# Patient Record
Sex: FEMALE | Race: WHITE | NOT HISPANIC OR LATINO
[De-identification: names, ages, dates, MRNs, and addresses within clinical notes are randomized per-mention and may not be internally consistent; named-entity substitution may affect disease eponyms.]

---

## 2017-05-15 ENCOUNTER — APPOINTMENT (OUTPATIENT)
Dept: MAMMOGRAPHY | Facility: IMAGING CENTER | Age: 57
End: 2017-05-15

## 2017-05-15 ENCOUNTER — OUTPATIENT (OUTPATIENT)
Dept: OUTPATIENT SERVICES | Facility: HOSPITAL | Age: 57
LOS: 1 days | End: 2017-05-15
Payer: COMMERCIAL

## 2017-05-15 DIAGNOSIS — Z00.8 ENCOUNTER FOR OTHER GENERAL EXAMINATION: ICD-10-CM

## 2017-05-15 PROCEDURE — 77067 SCR MAMMO BI INCL CAD: CPT

## 2017-05-15 PROCEDURE — 77063 BREAST TOMOSYNTHESIS BI: CPT

## 2017-05-24 ENCOUNTER — APPOINTMENT (OUTPATIENT)
Dept: SURGICAL ONCOLOGY | Facility: CLINIC | Age: 57
End: 2017-05-24

## 2017-05-24 VITALS
HEART RATE: 82 BPM | TEMPERATURE: 98.4 F | WEIGHT: 134 LBS | HEIGHT: 61 IN | BODY MASS INDEX: 25.3 KG/M2 | RESPIRATION RATE: 15 BRPM | DIASTOLIC BLOOD PRESSURE: 81 MMHG | SYSTOLIC BLOOD PRESSURE: 141 MMHG

## 2017-05-24 DIAGNOSIS — Z82.49 FAMILY HISTORY OF ISCHEMIC HEART DISEASE AND OTHER DISEASES OF THE CIRCULATORY SYSTEM: ICD-10-CM

## 2017-10-04 ENCOUNTER — TRANSCRIPTION ENCOUNTER (OUTPATIENT)
Age: 57
End: 2017-10-04

## 2018-05-15 ENCOUNTER — APPOINTMENT (OUTPATIENT)
Dept: MAMMOGRAPHY | Facility: IMAGING CENTER | Age: 58
End: 2018-05-15
Payer: COMMERCIAL

## 2018-05-15 ENCOUNTER — OUTPATIENT (OUTPATIENT)
Dept: OUTPATIENT SERVICES | Facility: HOSPITAL | Age: 58
LOS: 1 days | End: 2018-05-15
Payer: COMMERCIAL

## 2018-05-15 DIAGNOSIS — Z00.8 ENCOUNTER FOR OTHER GENERAL EXAMINATION: ICD-10-CM

## 2018-05-15 PROCEDURE — 77067 SCR MAMMO BI INCL CAD: CPT | Mod: 26

## 2018-05-15 PROCEDURE — 77063 BREAST TOMOSYNTHESIS BI: CPT | Mod: 26

## 2018-05-15 PROCEDURE — 77063 BREAST TOMOSYNTHESIS BI: CPT

## 2018-05-15 PROCEDURE — 77067 SCR MAMMO BI INCL CAD: CPT

## 2018-05-21 ENCOUNTER — APPOINTMENT (OUTPATIENT)
Dept: SURGICAL ONCOLOGY | Facility: CLINIC | Age: 58
End: 2018-05-21
Payer: COMMERCIAL

## 2018-05-21 VITALS
HEART RATE: 74 BPM | DIASTOLIC BLOOD PRESSURE: 75 MMHG | WEIGHT: 132 LBS | SYSTOLIC BLOOD PRESSURE: 126 MMHG | OXYGEN SATURATION: 98 % | HEIGHT: 61 IN | BODY MASS INDEX: 24.92 KG/M2

## 2018-05-21 PROCEDURE — 99214 OFFICE O/P EST MOD 30 MIN: CPT

## 2019-05-20 ENCOUNTER — APPOINTMENT (OUTPATIENT)
Dept: SURGICAL ONCOLOGY | Facility: CLINIC | Age: 59
End: 2019-05-20

## 2021-03-23 ENCOUNTER — RESULT REVIEW (OUTPATIENT)
Age: 61
End: 2021-03-23

## 2021-03-24 ENCOUNTER — RESULT REVIEW (OUTPATIENT)
Age: 61
End: 2021-03-24

## 2021-03-24 ENCOUNTER — APPOINTMENT (OUTPATIENT)
Dept: ULTRASOUND IMAGING | Facility: CLINIC | Age: 61
End: 2021-03-24
Payer: MEDICAID

## 2021-03-24 ENCOUNTER — APPOINTMENT (OUTPATIENT)
Dept: MAMMOGRAPHY | Facility: CLINIC | Age: 61
End: 2021-03-24
Payer: MEDICAID

## 2021-03-24 ENCOUNTER — OUTPATIENT (OUTPATIENT)
Dept: OUTPATIENT SERVICES | Facility: HOSPITAL | Age: 61
LOS: 1 days | End: 2021-03-24
Payer: MEDICAID

## 2021-03-24 DIAGNOSIS — N60.99 UNSPECIFIED BENIGN MAMMARY DYSPLASIA OF UNSPECIFIED BREAST: ICD-10-CM

## 2021-03-24 PROCEDURE — 77063 BREAST TOMOSYNTHESIS BI: CPT | Mod: 26

## 2021-03-24 PROCEDURE — 77067 SCR MAMMO BI INCL CAD: CPT | Mod: 26

## 2021-03-24 PROCEDURE — 76641 ULTRASOUND BREAST COMPLETE: CPT | Mod: 26,50

## 2021-03-24 PROCEDURE — 77063 BREAST TOMOSYNTHESIS BI: CPT

## 2021-03-24 PROCEDURE — 76641 ULTRASOUND BREAST COMPLETE: CPT

## 2021-03-24 PROCEDURE — 77067 SCR MAMMO BI INCL CAD: CPT

## 2021-03-26 ENCOUNTER — TRANSCRIPTION ENCOUNTER (OUTPATIENT)
Age: 61
End: 2021-03-26

## 2021-03-26 ENCOUNTER — APPOINTMENT (OUTPATIENT)
Dept: SURGICAL ONCOLOGY | Facility: CLINIC | Age: 61
End: 2021-03-26
Payer: MEDICAID

## 2021-03-26 VITALS
HEART RATE: 74 BPM | RESPIRATION RATE: 15 BRPM | DIASTOLIC BLOOD PRESSURE: 77 MMHG | BODY MASS INDEX: 25.68 KG/M2 | WEIGHT: 136 LBS | HEIGHT: 61 IN | SYSTOLIC BLOOD PRESSURE: 149 MMHG | OXYGEN SATURATION: 98 %

## 2021-03-26 DIAGNOSIS — N60.99 UNSPECIFIED BENIGN MAMMARY DYSPLASIA OF UNSPECIFIED BREAST: ICD-10-CM

## 2021-03-26 PROCEDURE — 99215 OFFICE O/P EST HI 40 MIN: CPT

## 2021-03-26 PROCEDURE — 99072 ADDL SUPL MATRL&STAF TM PHE: CPT

## 2021-03-26 RX ORDER — ATORVASTATIN CALCIUM 10 MG/1
10 TABLET, FILM COATED ORAL
Refills: 0 | Status: ACTIVE | COMMUNITY

## 2021-03-26 RX ORDER — APIXABAN 5 MG/1
5 TABLET, FILM COATED ORAL
Refills: 0 | Status: ACTIVE | COMMUNITY

## 2021-03-26 RX ORDER — DILTIAZEM HYDROCHLORIDE 240 MG/1
240 CAPSULE, EXTENDED RELEASE ORAL
Refills: 0 | Status: ACTIVE | COMMUNITY

## 2021-03-26 RX ORDER — LEVOTHYROXINE SODIUM 88 UG/1
88 TABLET ORAL
Refills: 0 | Status: ACTIVE | COMMUNITY

## 2021-03-26 NOTE — REASON FOR VISIT
[Initial Consultation] : an initial consultation for [FreeTextEntry2] : hx left breast ADH and radial scar

## 2021-03-26 NOTE — ASSESSMENT
[FreeTextEntry1] : IMP:\par History of left breast atypical ductal hyperplasia\par mammo/sono 3/2021 BIRADS2\par \par \par PLAN:\par mammo/sono 3/2022\par RTO 1 year

## 2021-03-26 NOTE — HISTORY OF PRESENT ILLNESS
[de-identified] : 61 year-old female presents to re establish care.  She was previously seen in 2018.  She is status post left breast excisional biospy on 10/11/16.  Pathology demonstrated radial scar with calcifications and containing atypical ductal hyperplasia.  Margins negative for atypia.\par \par She completed a bilateral mammogram/sono 3/2021 with benign findings (BIRADS 2). \par \par Her past medical history includes hypertension, hyperlipidemia, hypothyroidism, atrial fibrillation (on Pradaxa). \par \par  Family history maternal cousin breast cancer in 60s, maternal nephew with Tonsillar CA in 50s \par \par Denies palpable breast masses, nipple discharge, skin change or breast pain \par \par Internist: Dr. Jair Romero

## 2021-03-26 NOTE — CONSULT LETTER
[Dear  ___] : Dear  [unfilled], [Courtesy Letter:] : I had the pleasure of seeing your patient, [unfilled], in my office today. [Please see my note below.] : Please see my note below. [Consult Closing:] : Thank you very much for allowing me to participate in the care of this patient.  If you have any questions, please do not hesitate to contact me. [Sincerely,] : Sincerely, [FreeTextEntry1] : I will keep you informed of my annual follow-up. [FreeTextEntry3] : Rubio Dudley MD FACS\par Chief of Surgical Oncology\par \par

## 2021-03-26 NOTE — PHYSICAL EXAM
[Normal] : supple, no neck mass and thyroid not enlarged [Normal Neck Lymph Nodes] : normal neck lymph nodes  [Normal Supraclavicular Lymph Nodes] : normal supraclavicular lymph nodes [Normal Axillary Lymph Nodes] : normal axillary lymph nodes [Normal] : oriented to person, place and time, with appropriate affect [de-identified] : fibrocystic breast, but no masses or nipple discharge

## 2024-11-22 PROBLEM — R91.8 LUNG MASS: Status: ACTIVE | Noted: 2024-11-22

## 2024-11-27 ENCOUNTER — APPOINTMENT (OUTPATIENT)
Dept: THORACIC SURGERY | Facility: CLINIC | Age: 64
End: 2024-11-27
Payer: COMMERCIAL

## 2024-11-27 ENCOUNTER — NON-APPOINTMENT (OUTPATIENT)
Age: 64
End: 2024-11-27

## 2024-11-27 ENCOUNTER — APPOINTMENT (OUTPATIENT)
Dept: OTOLARYNGOLOGY | Facility: CLINIC | Age: 64
End: 2024-11-27
Payer: MEDICAID

## 2024-11-27 VITALS
OXYGEN SATURATION: 95 % | HEART RATE: 71 BPM | BODY MASS INDEX: 27.19 KG/M2 | DIASTOLIC BLOOD PRESSURE: 73 MMHG | SYSTOLIC BLOOD PRESSURE: 161 MMHG | HEIGHT: 61 IN | WEIGHT: 144 LBS

## 2024-11-27 VITALS
HEIGHT: 61 IN | HEART RATE: 73 BPM | BODY MASS INDEX: 27.19 KG/M2 | DIASTOLIC BLOOD PRESSURE: 68 MMHG | WEIGHT: 144 LBS | SYSTOLIC BLOOD PRESSURE: 123 MMHG

## 2024-11-27 DIAGNOSIS — J32.9 CHRONIC SINUSITIS, UNSPECIFIED: ICD-10-CM

## 2024-11-27 DIAGNOSIS — J38.3 OTHER DISEASES OF VOCAL CORDS: ICD-10-CM

## 2024-11-27 DIAGNOSIS — R91.8 OTHER NONSPECIFIC ABNORMAL FINDING OF LUNG FIELD: ICD-10-CM

## 2024-11-27 PROCEDURE — 31231 NASAL ENDOSCOPY DX: CPT

## 2024-11-27 PROCEDURE — 99205 OFFICE O/P NEW HI 60 MIN: CPT

## 2024-11-27 PROCEDURE — 99204 OFFICE O/P NEW MOD 45 MIN: CPT | Mod: 25

## 2024-11-27 RX ORDER — METOPROLOL TARTRATE 25 MG/1
25 TABLET ORAL
Refills: 0 | Status: ACTIVE | COMMUNITY

## 2024-11-27 RX ORDER — FLUTICASONE FUROATE, UMECLIDINIUM BROMIDE AND VILANTEROL TRIFENATATE 100; 62.5; 25 UG/1; UG/1; UG/1
100-62.5-25 POWDER RESPIRATORY (INHALATION)
Refills: 0 | Status: ACTIVE | COMMUNITY

## 2024-12-03 ENCOUNTER — NON-APPOINTMENT (OUTPATIENT)
Age: 64
End: 2024-12-03

## 2024-12-03 LAB — BACTERIA FLD CULT: ABNORMAL

## 2024-12-06 ENCOUNTER — OUTPATIENT (OUTPATIENT)
Dept: OUTPATIENT SERVICES | Facility: HOSPITAL | Age: 64
LOS: 1 days | End: 2024-12-06

## 2024-12-06 VITALS
WEIGHT: 147.93 LBS | OXYGEN SATURATION: 98 % | TEMPERATURE: 98 F | SYSTOLIC BLOOD PRESSURE: 122 MMHG | RESPIRATION RATE: 16 BRPM | HEIGHT: 60.5 IN | DIASTOLIC BLOOD PRESSURE: 73 MMHG | HEART RATE: 61 BPM

## 2024-12-06 DIAGNOSIS — J43.9 EMPHYSEMA, UNSPECIFIED: ICD-10-CM

## 2024-12-06 DIAGNOSIS — R91.8 OTHER NONSPECIFIC ABNORMAL FINDING OF LUNG FIELD: ICD-10-CM

## 2024-12-06 DIAGNOSIS — E03.9 HYPOTHYROIDISM, UNSPECIFIED: ICD-10-CM

## 2024-12-06 DIAGNOSIS — Z90.79 ACQUIRED ABSENCE OF OTHER GENITAL ORGAN(S): Chronic | ICD-10-CM

## 2024-12-06 DIAGNOSIS — I10 ESSENTIAL (PRIMARY) HYPERTENSION: ICD-10-CM

## 2024-12-06 DIAGNOSIS — Z98.890 OTHER SPECIFIED POSTPROCEDURAL STATES: Chronic | ICD-10-CM

## 2024-12-06 DIAGNOSIS — I48.91 UNSPECIFIED ATRIAL FIBRILLATION: ICD-10-CM

## 2024-12-06 NOTE — H&P PST ADULT - PROBLEM SELECTOR PLAN 1
Patient tentatively scheduled for surgery on: 12/12/24  Provided with verbal and written presurgical instructions  Verbalized understanding  with teach back on the following: Famotidine for GI prophylaxis Patient tentatively scheduled for surgery on: 12/12/24  Provided with verbal and written presurgical instructions  Verbalized understanding  with teach back on the following: Famotidine for GI prophylaxis    MARIA FERNANDA precaution

## 2024-12-06 NOTE — H&P PST ADULT - HISTORY OF PRESENT ILLNESS
64 year old female, current smoker (2PPD x50yrs),presents with PMH of Carotid atherosclerosis, CAD (no interventions), emphysema, HLD, HTN, Hypothyroidism, Paroxysmal Afib on Eliquis, Sleep apnea, patient reports PETCT performed annually, new changes noted, patient also complain of new cough, scheduled for galaxy robotic navigational bronchoscopy with fluroscopy

## 2024-12-06 NOTE — H&P PST ADULT - RESPIRATORY AND THORAX COMMENTS
History of emphysema denies recent exacerbations, intubation or hospitalization due to emphysema, controlled

## 2024-12-06 NOTE — H&P PST ADULT - NEGATIVE NEUROLOGICAL SYMPTOMS
no weakness/no paresthesias/no generalized seizures/no focal seizures/no vertigo/no loss of sensation/no difficulty walking

## 2024-12-06 NOTE — H&P PST ADULT - NSICDXFAMILYHX_GEN_ALL_CORE_FT
FAMILY HISTORY:  Father  Still living? Unknown  FH: heart disease, Age at diagnosis: Age Unknown  FH: stroke, Age at diagnosis: Age Unknown    Mother  Still living? Unknown  FH: heart disease, Age at diagnosis: Age Unknown  FH: stroke, Age at diagnosis: Age Unknown    Sibling  Still living? Unknown  FH: heart disease, Age at diagnosis: Age Unknown  FHx: lung cancer, Age at diagnosis: Age Unknown

## 2024-12-06 NOTE — H&P PST ADULT - ATTENDING COMMENTS
[No Acute Distress] : no acute distress [Well Nourished] : well nourished [Normal Outer Ear/Nose] : the outer ears and nose were normal in appearance [No Respiratory Distress] : no respiratory distress  [Clear to Auscultation] : lungs were clear to auscultation bilaterally [Normal Rate] : normal rate  [Regular Rhythm] : with a regular rhythm [Normal S1, S2] : normal S1 and S2 [Soft] : abdomen soft [Non Tender] : non-tender [Non-distended] : non-distended [Normal Bowel Sounds] : normal bowel sounds [No HSM] : no HSM [Declined Rectal Exam] : declined rectal exam [No Stool to Guaiac] : no stool to guaiac [No CVA Tenderness] : no CVA  tenderness [No Focal Deficits] : no focal deficits [No Joint Swelling] : no joint swelling [Alert and Oriented x3] : oriented to person, place, and time [de-identified] : Hx of glaucoma [de-identified] : + 2 edema [de-identified] : Right LE with drressing in place [de-identified] : Has periods of forgetfulness Patient here for flexible bronchoscopy, robotic assisted navigation bronchoscopy, lung biopsy, ebus and lymph node biopsy. Risks including but not limited to pneumothorax and bleeding discussed, and possible interventions. Agreeable to proceed.

## 2024-12-06 NOTE — H&P PST ADULT - NEGATIVE ENMT SYMPTOMS
no ear pain/no vertigo/no sinus symptoms/no nasal congestion/no dry mouth/no throat pain/no dysphagia

## 2024-12-06 NOTE — H&P PST ADULT - NSICDXPASTMEDICALHX_GEN_ALL_CORE_FT
PAST MEDICAL HISTORY:  Atrial fibrillation     Breast mass     CAD (coronary artery disease)     Carotid atherosclerosis     Central sleep apnea     Emphysema lung     Fallopian tube disorder     Heavy smoker     HLD (hyperlipidemia)     HTN (hypertension)     Hypothyroidism     Mitral regurgitation     Other nonspecific abnormal finding of lung field

## 2024-12-09 PROBLEM — F17.200 NICOTINE DEPENDENCE, UNSPECIFIED, UNCOMPLICATED: Chronic | Status: ACTIVE | Noted: 2024-12-06

## 2024-12-09 PROBLEM — J43.9 EMPHYSEMA, UNSPECIFIED: Chronic | Status: ACTIVE | Noted: 2024-12-06

## 2024-12-09 PROBLEM — I10 ESSENTIAL (PRIMARY) HYPERTENSION: Chronic | Status: ACTIVE | Noted: 2024-12-06

## 2024-12-09 PROBLEM — I65.29 OCCLUSION AND STENOSIS OF UNSPECIFIED CAROTID ARTERY: Chronic | Status: ACTIVE | Noted: 2024-12-06

## 2024-12-09 PROBLEM — R91.8 OTHER NONSPECIFIC ABNORMAL FINDING OF LUNG FIELD: Chronic | Status: ACTIVE | Noted: 2024-12-06

## 2024-12-09 PROBLEM — E78.5 HYPERLIPIDEMIA, UNSPECIFIED: Chronic | Status: ACTIVE | Noted: 2024-12-06

## 2024-12-09 PROBLEM — N63.0 UNSPECIFIED LUMP IN UNSPECIFIED BREAST: Chronic | Status: ACTIVE | Noted: 2024-12-06

## 2024-12-09 PROBLEM — I34.0 NONRHEUMATIC MITRAL (VALVE) INSUFFICIENCY: Chronic | Status: ACTIVE | Noted: 2024-12-06

## 2024-12-09 PROBLEM — E03.9 HYPOTHYROIDISM, UNSPECIFIED: Chronic | Status: ACTIVE | Noted: 2024-12-06

## 2024-12-09 PROBLEM — I25.10 ATHEROSCLEROTIC HEART DISEASE OF NATIVE CORONARY ARTERY WITHOUT ANGINA PECTORIS: Chronic | Status: ACTIVE | Noted: 2024-12-06

## 2024-12-09 PROBLEM — I48.91 UNSPECIFIED ATRIAL FIBRILLATION: Chronic | Status: ACTIVE | Noted: 2024-12-06

## 2024-12-09 PROBLEM — G47.31 PRIMARY CENTRAL SLEEP APNEA: Chronic | Status: ACTIVE | Noted: 2024-12-06

## 2024-12-09 PROBLEM — N83.9 NONINFLAMMATORY DISORDER OF OVARY, FALLOPIAN TUBE AND BROAD LIGAMENT, UNSPECIFIED: Chronic | Status: ACTIVE | Noted: 2024-12-06

## 2024-12-12 ENCOUNTER — RESULT REVIEW (OUTPATIENT)
Age: 64
End: 2024-12-12

## 2024-12-12 ENCOUNTER — APPOINTMENT (OUTPATIENT)
Dept: THORACIC SURGERY | Facility: HOSPITAL | Age: 64
End: 2024-12-12

## 2024-12-12 ENCOUNTER — OUTPATIENT (OUTPATIENT)
Dept: INPATIENT UNIT | Facility: HOSPITAL | Age: 64
LOS: 1 days | Discharge: HOME | End: 2024-12-12
Payer: COMMERCIAL

## 2024-12-12 ENCOUNTER — APPOINTMENT (OUTPATIENT)
Dept: PULMONOLOGY | Facility: HOSPITAL | Age: 64
End: 2024-12-12
Payer: COMMERCIAL

## 2024-12-12 ENCOUNTER — TRANSCRIPTION ENCOUNTER (OUTPATIENT)
Age: 64
End: 2024-12-12

## 2024-12-12 VITALS
HEART RATE: 67 BPM | DIASTOLIC BLOOD PRESSURE: 50 MMHG | SYSTOLIC BLOOD PRESSURE: 102 MMHG | RESPIRATION RATE: 15 BRPM | OXYGEN SATURATION: 100 %

## 2024-12-12 VITALS
SYSTOLIC BLOOD PRESSURE: 120 MMHG | DIASTOLIC BLOOD PRESSURE: 58 MMHG | OXYGEN SATURATION: 98 % | TEMPERATURE: 98 F | RESPIRATION RATE: 16 BRPM | HEART RATE: 65 BPM | WEIGHT: 147.93 LBS | HEIGHT: 60.5 IN

## 2024-12-12 DIAGNOSIS — Z98.890 OTHER SPECIFIED POSTPROCEDURAL STATES: ICD-10-CM

## 2024-12-12 DIAGNOSIS — R91.1 SOLITARY PULMONARY NODULE: ICD-10-CM

## 2024-12-12 DIAGNOSIS — R91.8 OTHER NONSPECIFIC ABNORMAL FINDING OF LUNG FIELD: ICD-10-CM

## 2024-12-12 DIAGNOSIS — Z90.79 ACQUIRED ABSENCE OF OTHER GENITAL ORGAN(S): Chronic | ICD-10-CM

## 2024-12-12 DIAGNOSIS — Z98.890 OTHER SPECIFIED POSTPROCEDURAL STATES: Chronic | ICD-10-CM

## 2024-12-12 LAB
GRAM STN FLD: SIGNIFICANT CHANGE UP
SPECIMEN SOURCE: SIGNIFICANT CHANGE UP

## 2024-12-12 PROCEDURE — 88333 PATH CONSLTJ SURG CYTO XM 1: CPT | Mod: 26,59

## 2024-12-12 PROCEDURE — 31653 BRONCH EBUS SAMPLNG 3/> NODE: CPT | Mod: GC

## 2024-12-12 PROCEDURE — 71045 X-RAY EXAM CHEST 1 VIEW: CPT | Mod: 26

## 2024-12-12 PROCEDURE — 31628 BRONCHOSCOPY/LUNG BX EACH: CPT | Mod: GC

## 2024-12-12 PROCEDURE — 88112 CYTOPATH CELL ENHANCE TECH: CPT | Mod: 26,59

## 2024-12-12 PROCEDURE — 31627 NAVIGATIONAL BRONCHOSCOPY: CPT | Mod: GC

## 2024-12-12 PROCEDURE — 88173 CYTOPATH EVAL FNA REPORT: CPT | Mod: 26

## 2024-12-12 PROCEDURE — 31624 DX BRONCHOSCOPE/LAVAGE: CPT | Mod: GC

## 2024-12-12 PROCEDURE — 88305 TISSUE EXAM BY PATHOLOGIST: CPT | Mod: 26

## 2024-12-12 PROCEDURE — 31654 BRONCH EBUS IVNTJ PERPH LES: CPT | Mod: GC

## 2024-12-12 PROCEDURE — ZZZZZ: CPT

## 2024-12-12 PROCEDURE — 31629 BRONCHOSCOPY/NEEDLE BX EACH: CPT | Mod: GC

## 2024-12-12 PROCEDURE — 31645 BRNCHSC W/THER ASPIR 1ST: CPT | Mod: GC

## 2024-12-12 PROCEDURE — 88172 CYTP DX EVAL FNA 1ST EA SITE: CPT | Mod: 26,59

## 2024-12-12 DEVICE — SYS GALAXY BRONCHOSCOPE SINGLE USE: Type: IMPLANTABLE DEVICE | Status: FUNCTIONAL

## 2024-12-12 RX ORDER — GLUCAGON INJECTION, SOLUTION 0.5 MG/.1ML
1 INJECTION, SOLUTION SUBCUTANEOUS ONCE
Refills: 0 | Status: DISCONTINUED | OUTPATIENT
Start: 2024-12-12 | End: 2024-12-12

## 2024-12-12 RX ORDER — 0.9 % SODIUM CHLORIDE 0.9 %
1000 INTRAVENOUS SOLUTION INTRAVENOUS
Refills: 0 | Status: DISCONTINUED | OUTPATIENT
Start: 2024-12-12 | End: 2024-12-12

## 2024-12-12 RX ORDER — FENTANYL 12 UG/H
25 PATCH, EXTENDED RELEASE TRANSDERMAL
Refills: 0 | Status: DISCONTINUED | OUTPATIENT
Start: 2024-12-12 | End: 2024-12-12

## 2024-12-12 RX ORDER — FLUTICASONE FUROATE, UMECLIDINIUM BROMIDE AND VILANTEROL TRIFENATATE 100; 62.5; 25 UG/1; UG/1; UG/1
1 POWDER RESPIRATORY (INHALATION)
Refills: 0 | DISCHARGE

## 2024-12-12 RX ORDER — LOSARTAN POTASSIUM 100 MG/1
1 TABLET, FILM COATED ORAL
Refills: 0 | DISCHARGE

## 2024-12-12 RX ORDER — FENTANYL 12 UG/H
50 PATCH, EXTENDED RELEASE TRANSDERMAL
Refills: 0 | Status: DISCONTINUED | OUTPATIENT
Start: 2024-12-12 | End: 2024-12-12

## 2024-12-12 RX ORDER — IPRATROPIUM BROMIDE 42 MCG
2 AEROSOL, SPRAY (ML) NASAL
Refills: 0 | DISCHARGE

## 2024-12-12 RX ORDER — FLUTICASONE PROPIONATE 50 MCG
1 SPRAY, SUSPENSION NASAL
Refills: 0 | DISCHARGE

## 2024-12-12 RX ORDER — DILTIAZEM HYDROCHLORIDE 240 MG/1
1 CAPSULE, COATED, EXTENDED RELEASE ORAL
Refills: 0 | DISCHARGE

## 2024-12-12 RX ORDER — APIXABAN 2.5 MG/1
1 TABLET, FILM COATED ORAL
Refills: 0 | DISCHARGE

## 2024-12-12 RX ORDER — LEVOTHYROXINE SODIUM 150 MCG
1 TABLET ORAL
Refills: 0 | DISCHARGE

## 2024-12-12 RX ORDER — METOPROLOL TARTRATE 100 MG/1
0.5 TABLET, FILM COATED ORAL
Refills: 0 | DISCHARGE

## 2024-12-12 RX ORDER — ONDANSETRON HYDROCHLORIDE 4 MG/1
4 TABLET, FILM COATED ORAL ONCE
Refills: 0 | Status: ACTIVE | OUTPATIENT
Start: 2024-12-12 | End: 2025-11-10

## 2024-12-12 RX ADMIN — Medication 0.5 MILLILITER(S): at 09:50

## 2024-12-12 NOTE — ASU DISCHARGE PLAN (ADULT/PEDIATRIC) - CARE PROVIDER_API CALL
Spencer Bellamy  Pulmonary Disease  [address] [place] [id number]  Phone: [phone]  Fax: [phone]  Follow Up Time: 2 weeks

## 2024-12-12 NOTE — BRIEF OPERATIVE NOTE - SPECIMENS
RUL nodule transbronchial biopsy, transbronchial needle aspiration for cytology. RUL BAL for cyto, micro. EBUS TBNA of levels 4R, 4L, 7, 11R for cytology.

## 2024-12-12 NOTE — ASU DISCHARGE PLAN (ADULT/PEDIATRIC) - ASU DC SPECIAL INSTRUCTIONSFT
- Please restart Eliquis tomorrow  - If significant bleeding (spoonfull of blood) when coughing discontinue Eliquis and come to ER

## 2024-12-12 NOTE — ASU DISCHARGE PLAN (ADULT/PEDIATRIC) - FINANCIAL ASSISTANCE
[facility] provides services at a reduced cost to those who are determined to be eligible through [facility]’s financial assistance program. Information regarding [facility]’s financial assistance program can be found by going to [email or web address] or by calling [phone].

## 2024-12-12 NOTE — ASU DISCHARGE PLAN (ADULT/PEDIATRIC) - NURSING INSTRUCTIONS
Take Tylenol 650mg-1000mg every 6 hours as needed for pain. DO NOT TAKE MORE THAN 4000 MG OF TYLENOL in a 24 hour period.

## 2024-12-12 NOTE — BRIEF OPERATIVE NOTE - OPERATION/FINDINGS
Robotic (Galaxy) navigational bronchoscopy with transbronchial needle aspiration transbronchial biopsies done of RUL nodule, with additional fluoroscopic and r-EBUS guidance. BAL performed in RUL RB1, RB3. EBUS TBNA of lymph node stations 4L, 4R, 7, 11R performed. Routine bronchoscopy performed at end with gentle aspiration of secretions and blood. No active bleeding at biopsy sites or otherwise.

## 2024-12-13 LAB
NIGHT BLUE STAIN TISS: SIGNIFICANT CHANGE UP
SPECIMEN SOURCE: SIGNIFICANT CHANGE UP

## 2024-12-14 LAB
CULTURE RESULTS: NO GROWTH — SIGNIFICANT CHANGE UP
SPECIMEN SOURCE: SIGNIFICANT CHANGE UP

## 2024-12-17 LAB — NON-GYNECOLOGICAL CYTOLOGY STUDY: SIGNIFICANT CHANGE UP

## 2024-12-18 ENCOUNTER — APPOINTMENT (OUTPATIENT)
Dept: OTOLARYNGOLOGY | Facility: CLINIC | Age: 64
End: 2024-12-18
Payer: COMMERCIAL

## 2024-12-18 PROCEDURE — 31579 LARYNGOSCOPY TELESCOPIC: CPT | Mod: GN

## 2024-12-18 PROCEDURE — 92524 BEHAVRAL QUALIT ANALYS VOICE: CPT | Mod: GN

## 2024-12-20 ENCOUNTER — NON-APPOINTMENT (OUTPATIENT)
Age: 64
End: 2024-12-20

## 2024-12-20 ENCOUNTER — APPOINTMENT (OUTPATIENT)
Dept: OTOLARYNGOLOGY | Facility: CLINIC | Age: 64
End: 2024-12-20
Payer: COMMERCIAL

## 2024-12-20 VITALS
BODY MASS INDEX: 27.21 KG/M2 | HEART RATE: 67 BPM | SYSTOLIC BLOOD PRESSURE: 134 MMHG | WEIGHT: 146 LBS | HEIGHT: 61.5 IN | DIASTOLIC BLOOD PRESSURE: 77 MMHG

## 2024-12-20 PROCEDURE — 99213 OFFICE O/P EST LOW 20 MIN: CPT

## 2024-12-23 ENCOUNTER — APPOINTMENT (OUTPATIENT)
Dept: OTOLARYNGOLOGY | Facility: CLINIC | Age: 64
End: 2024-12-23
Payer: COMMERCIAL

## 2024-12-23 VITALS
DIASTOLIC BLOOD PRESSURE: 73 MMHG | WEIGHT: 146 LBS | OXYGEN SATURATION: 98 % | HEIGHT: 60.5 IN | SYSTOLIC BLOOD PRESSURE: 126 MMHG | HEART RATE: 71 BPM | BODY MASS INDEX: 27.92 KG/M2

## 2024-12-23 DIAGNOSIS — J38.3 OTHER DISEASES OF VOCAL CORDS: ICD-10-CM

## 2024-12-23 DIAGNOSIS — R91.8 OTHER NONSPECIFIC ABNORMAL FINDING OF LUNG FIELD: ICD-10-CM

## 2024-12-23 DIAGNOSIS — J32.9 CHRONIC SINUSITIS, UNSPECIFIED: ICD-10-CM

## 2024-12-23 PROCEDURE — 99213 OFFICE O/P EST LOW 20 MIN: CPT | Mod: 25

## 2024-12-23 PROCEDURE — 31579 LARYNGOSCOPY TELESCOPIC: CPT

## 2024-12-23 RX ORDER — LOSARTAN POTASSIUM 100 MG/1
100 TABLET, FILM COATED ORAL
Refills: 0 | Status: ACTIVE | COMMUNITY

## 2024-12-23 RX ORDER — ATORVASTATIN CALCIUM 20 MG/1
20 TABLET, FILM COATED ORAL
Refills: 0 | Status: ACTIVE | COMMUNITY

## 2024-12-23 RX ORDER — LEVOTHYROXINE SODIUM 137 UG/1
137 TABLET ORAL
Refills: 0 | Status: ACTIVE | COMMUNITY

## 2024-12-23 RX ORDER — FLUTICASONE PROPIONATE 50 UG/1
50 SPRAY, METERED NASAL
Refills: 0 | Status: ACTIVE | COMMUNITY

## 2024-12-23 RX ORDER — DILTIAZEM HYDROCHLORIDE 240 MG/1
240 CAPSULE, EXTENDED RELEASE ORAL
Refills: 0 | Status: ACTIVE | COMMUNITY

## 2024-12-27 ENCOUNTER — APPOINTMENT (OUTPATIENT)
Dept: PULMONOLOGY | Facility: CLINIC | Age: 64
End: 2024-12-27
Payer: COMMERCIAL

## 2024-12-27 PROCEDURE — 94726 PLETHYSMOGRAPHY LUNG VOLUMES: CPT

## 2024-12-27 PROCEDURE — 94010 BREATHING CAPACITY TEST: CPT

## 2024-12-27 PROCEDURE — 94729 DIFFUSING CAPACITY: CPT

## 2025-01-06 ENCOUNTER — OUTPATIENT (OUTPATIENT)
Dept: OUTPATIENT SERVICES | Facility: HOSPITAL | Age: 65
LOS: 1 days | End: 2025-01-06

## 2025-01-06 VITALS
RESPIRATION RATE: 16 BRPM | DIASTOLIC BLOOD PRESSURE: 78 MMHG | WEIGHT: 147.05 LBS | SYSTOLIC BLOOD PRESSURE: 140 MMHG | HEART RATE: 70 BPM | TEMPERATURE: 98 F | OXYGEN SATURATION: 97 % | HEIGHT: 60.5 IN

## 2025-01-06 DIAGNOSIS — G47.33 OBSTRUCTIVE SLEEP APNEA (ADULT) (PEDIATRIC): ICD-10-CM

## 2025-01-06 DIAGNOSIS — R91.8 OTHER NONSPECIFIC ABNORMAL FINDING OF LUNG FIELD: ICD-10-CM

## 2025-01-06 DIAGNOSIS — J43.9 EMPHYSEMA, UNSPECIFIED: ICD-10-CM

## 2025-01-06 DIAGNOSIS — Z98.890 OTHER SPECIFIED POSTPROCEDURAL STATES: Chronic | ICD-10-CM

## 2025-01-06 DIAGNOSIS — I10 ESSENTIAL (PRIMARY) HYPERTENSION: ICD-10-CM

## 2025-01-06 DIAGNOSIS — N60.99 UNSPECIFIED BENIGN MAMMARY DYSPLASIA OF UNSPECIFIED BREAST: ICD-10-CM

## 2025-01-06 DIAGNOSIS — Z90.79 ACQUIRED ABSENCE OF OTHER GENITAL ORGAN(S): Chronic | ICD-10-CM

## 2025-01-06 LAB
ANION GAP SERPL CALC-SCNC: 15 MMOL/L — HIGH (ref 7–14)
BASOPHILS # BLD AUTO: 0.04 K/UL — SIGNIFICANT CHANGE UP (ref 0–0.2)
BASOPHILS NFR BLD AUTO: 0.5 % — SIGNIFICANT CHANGE UP (ref 0–2)
BLD GP AB SCN SERPL QL: NEGATIVE — SIGNIFICANT CHANGE UP
BUN SERPL-MCNC: 13 MG/DL — SIGNIFICANT CHANGE UP (ref 7–23)
CALCIUM SERPL-MCNC: 9.4 MG/DL — SIGNIFICANT CHANGE UP (ref 8.4–10.5)
CHLORIDE SERPL-SCNC: 100 MMOL/L — SIGNIFICANT CHANGE UP (ref 98–107)
CO2 SERPL-SCNC: 23 MMOL/L — SIGNIFICANT CHANGE UP (ref 22–31)
CREAT SERPL-MCNC: 0.58 MG/DL — SIGNIFICANT CHANGE UP (ref 0.5–1.3)
EGFR: 102 ML/MIN/1.73M2 — SIGNIFICANT CHANGE UP
EOSINOPHIL # BLD AUTO: 0.11 K/UL — SIGNIFICANT CHANGE UP (ref 0–0.5)
EOSINOPHIL NFR BLD AUTO: 1.3 % — SIGNIFICANT CHANGE UP (ref 0–6)
GLUCOSE SERPL-MCNC: 91 MG/DL — SIGNIFICANT CHANGE UP (ref 70–99)
HCT VFR BLD CALC: 42 % — SIGNIFICANT CHANGE UP (ref 34.5–45)
HGB BLD-MCNC: 14.8 G/DL — SIGNIFICANT CHANGE UP (ref 11.5–15.5)
IMM GRANULOCYTES NFR BLD AUTO: 0.3 % — SIGNIFICANT CHANGE UP (ref 0–0.9)
LYMPHOCYTES # BLD AUTO: 2.38 K/UL — SIGNIFICANT CHANGE UP (ref 1–3.3)
LYMPHOCYTES # BLD AUTO: 27.3 % — SIGNIFICANT CHANGE UP (ref 13–44)
MCHC RBC-ENTMCNC: 33.7 PG — SIGNIFICANT CHANGE UP (ref 27–34)
MCHC RBC-ENTMCNC: 35.2 G/DL — SIGNIFICANT CHANGE UP (ref 32–36)
MCV RBC AUTO: 95.7 FL — SIGNIFICANT CHANGE UP (ref 80–100)
MONOCYTES # BLD AUTO: 0.63 K/UL — SIGNIFICANT CHANGE UP (ref 0–0.9)
MONOCYTES NFR BLD AUTO: 7.2 % — SIGNIFICANT CHANGE UP (ref 2–14)
NEUTROPHILS # BLD AUTO: 5.52 K/UL — SIGNIFICANT CHANGE UP (ref 1.8–7.4)
NEUTROPHILS NFR BLD AUTO: 63.4 % — SIGNIFICANT CHANGE UP (ref 43–77)
PLATELET # BLD AUTO: 164 K/UL — SIGNIFICANT CHANGE UP (ref 150–400)
POTASSIUM SERPL-MCNC: 4 MMOL/L — SIGNIFICANT CHANGE UP (ref 3.5–5.3)
POTASSIUM SERPL-SCNC: 4 MMOL/L — SIGNIFICANT CHANGE UP (ref 3.5–5.3)
RBC # BLD: 4.39 M/UL — SIGNIFICANT CHANGE UP (ref 3.8–5.2)
RBC # FLD: 12.9 % — SIGNIFICANT CHANGE UP (ref 10.3–14.5)
RH IG SCN BLD-IMP: NEGATIVE — SIGNIFICANT CHANGE UP
RH IG SCN BLD-IMP: NEGATIVE — SIGNIFICANT CHANGE UP
SODIUM SERPL-SCNC: 138 MMOL/L — SIGNIFICANT CHANGE UP (ref 135–145)
WBC # BLD: 8.71 K/UL — SIGNIFICANT CHANGE UP (ref 3.8–10.5)
WBC # FLD AUTO: 8.71 K/UL — SIGNIFICANT CHANGE UP (ref 3.8–10.5)

## 2025-01-06 RX ORDER — SODIUM CHLORIDE 9 MG/ML
1000 INJECTION, SOLUTION INTRAVENOUS
Refills: 0 | Status: DISCONTINUED | OUTPATIENT
Start: 2025-01-16 | End: 2025-01-17

## 2025-01-06 NOTE — H&P PST ADULT - PSYCHIATRIC
negative normal/normal affect/alert and oriented x3/normal behavior details… normal affect/alert and oriented x3/normal behavior

## 2025-01-06 NOTE — H&P PST ADULT - RS GEN HX ROS MEA POS PC
Chronic cough with brownish sputum since March - see HPI/cough/sputum production Chronic cough with brownish sputum since March /24 , bronchoscopy done, path showed   RUL non-diagnostic, LNs negative for malignant cells/cough/sputum production

## 2025-01-06 NOTE — H&P PST ADULT - OTHER CARE PROVIDERS
[facility]  [facility],  Dr Vaughn Daniel -[facility]ulmonologist [facility]  [facility],                                                                  Dr Vaughn Daniel -[phone] Pulmonologist

## 2025-01-06 NOTE — H&P PST ADULT - NEGATIVE CARDIOVASCULAR SYMPTOMS
no chest pain/no dyspnea on exertion/no peripheral edema/no claudication no chest pain/no palpitations/no dyspnea on exertion/no peripheral edema/no claudication

## 2025-01-06 NOTE — H&P PST ADULT - CARDIOVASCULAR
details… regular rate and rhythm/S1 S2 present/no murmur/no pedal edema regular rate and rhythm/S1 S2 present/no murmur/no pedal edema/vascular regular rate and rhythm/S1 S2 present/no JVD/no pedal edema/vascular

## 2025-01-06 NOTE — H&P PST ADULT - FUNCTIONAL STATUS
DASI score: 7.99 - states she avoids certain activities due to her age/4-10 METS Mets Dasi score 7.25/4-10 METS

## 2025-01-06 NOTE — H&P PST ADULT - PROBLEM SELECTOR PLAN 3
Patient instructed to hold eliquis three days piror to surgery by cardiologist , last dose 12/8/24 Continue on antihypertensive medication .Pt takes losartan , Tiadylt  at night.

## 2025-01-06 NOTE — H&P PST ADULT - CONTACT INFO FOR SLEEP STUDY
tested 10 yrs ago , was tested in 03/24 and noted to have now mild sleep apnea tested 10 yrs ago , repeat test  in 03/24 and noted to have now mild sleep apnea - no device use tested 10 yrs ago , repeat test  in 03/24 and noted to have now mild sleep apnea - no device treatment  needed rec by pulm as per pt

## 2025-01-06 NOTE — H&P PST ADULT - MUSCULOSKELETAL
normal/ROM intact/normal gait/strength 5/5 bilateral upper extremities/strength 5/5 bilateral lower extremities details… ROM intact/normal gait/strength 5/5 bilateral upper extremities/strength 5/5 bilateral lower extremities/extremities exam

## 2025-01-06 NOTE — H&P PST ADULT - PROBLEM SELECTOR PLAN 4
Instructed to take thyroid medication as prescribed with small sips of water on date of service MARIA FERNANDA precautions.

## 2025-01-06 NOTE — H&P PST ADULT - PROBLEM SELECTOR PLAN 5
Patient instructed to take metoprolol on day of procedure with small sips of water, verbalized understanding. Patient instructed to take Inhaler Trelegy on the morning of procedure.

## 2025-01-06 NOTE — H&P PST ADULT - HISTORY OF PRESENT ILLNESS
64 year old female, current smoker (2PPD x50yrs),presents with PMH of Carotid atherosclerosis, CAD (no interventions), emphysema, HLD, HTN, Hypothyroidism, Paroxysmal Afib on Eliquis, Sleep apnea, patient reports PETCT performed annually, new changes noted, patient also complain of new cough, scheduled for galaxy robotic navigational bronchoscopy with fluroscopy 64 year old female, current smoker (2PPD x50yrs),presents with hx  of Carotid atherosclerosis, CAD (no intervention), emphysema ( denies recent exacerbation) , HLD, HTN, Hypothyroidism, Paroxysmal Afib on Eliquis, Mild MARIA FERNANDA  -no intervention had recent bronchoscopy 12/24 done for cough since 03/24 and new changes in PET/CT performed annually with  new RUL nodule with unclear etiology , path showed RUL non-diagnostic, LNs negative for malignant cells presents to PST today with pre op dx of lung mass is scheduled for robotic assisted , right video- assisted thorascopic surgery ( VATS) ,right upper lobe wedge resection possible lobectomy.    64 year old female, current smoker (2PPD x50yrs),presents with hx of Carotid atherosclerosis,CAD (no intervention), emphysema ( denies recent exacerbation) , HLD, HTN, Hypothyroidism, Paroxysmal Afib on Eliquis, Mild MARIA FERNANDA  -no intervention had recent bronchoscopy 12/24 done for cough since 03/24 and new changes in PET/CT performed annually with  new RUL nodule with unclear etiology , path showed RUL non-diagnostic, LNs negative for malignant cells presents to PST today with pre op dx of mass of right lung is scheduled for robotic assisted , right video- assisted thorascopic surgery ( VATS) ,right upper lobe wedge resection possible lobectomy.

## 2025-01-06 NOTE — H&P PST ADULT - ENMT COMMENTS
FROM of neck; Denies loose teeth FROM of neck c/o  right vocal fold lesion , saw ENT who is almost certainly, its a cyst and  recommended against excision at this time. Instead to follow up in 3 months to demonstrate stability of lesion. and to notify ENT if her voice get worse and lesion larger in future to discuss in office vs in OR excision. c/o  right vocal fold lesion , saw ENT who dx'd with  cyst and recommended against excision at this time. Instead to follow up in 3 months to demonstrate stability of lesion. and to notify ENT if pt's  voice get worse and lesion larger in future to discuss in office vs in OR excision.

## 2025-01-06 NOTE — H&P PST ADULT - RESPIRATORY AND THORAX COMMENTS
History of emphysema denies recent exacerbations, intubation or hospitalization due to emphysema, controlled hx of emphysema denies recent exacerbations, intubation or hospitalization due to emphysema, controlled hx of emphysema denies recent exacerbations, intubation or hospitalization

## 2025-01-06 NOTE — H&P PST ADULT - CARDIOVASCULAR COMMENTS
A-fib on Eliquis , Reports CAD- denies angiogram in the past hx of A-fib on Eliquis , Reports CAD- denies angiogram in the past - follows cardiologist

## 2025-01-06 NOTE — H&P PST ADULT - PROBLEM SELECTOR PLAN 2
Instructed to use inhaler as prescribed Patient instructed by cardiologist and surgeon per pt ,to take last dose of Eliquis on 01/10/2025  Patient instructed to take metoprolol with a sip of water on the morning of procedure.

## 2025-01-06 NOTE — H&P PST ADULT - RESPIRATORY
normal/clear to auscultation bilaterally/no wheezes/no rales/no rhonchi/no respiratory distress/breath sounds equal clear to auscultation bilaterally/no wheezes/no respiratory distress/breath sounds equal/respirations non-labored

## 2025-01-06 NOTE — H&P PST ADULT - LAST ECHOCARDIOGRAM
stress-echo performed three years ago with cardiologist, "routine exam", normal per patient pt states -stress-echo performed three years ago with cardiologist, "routine exam", normal per patient

## 2025-01-06 NOTE — H&P PST ADULT - PROBLEM SELECTOR PLAN 1
Patient tentatively scheduled for surgery on: 12/12/24  Provided with verbal and written presurgical instructions  Verbalized understanding  with teach back on the following: Famotidine for GI prophylaxis    MARIA FERNANDA precaution Patient tentatively scheduled for robotic assisted , right video- assisted thorascopic surgery ( VATS) ,right upper lobe wedge resection possible lobectomy on 01/14/2025  Pre-op instructions provided. Pt given verbal and written instructions with teach back on chlorhexidine wash  and pepcid. Pt verbalized understanding with return demonstration.  Labs done.

## 2025-01-09 ENCOUNTER — APPOINTMENT (OUTPATIENT)
Dept: MAMMOGRAPHY | Facility: CLINIC | Age: 65
End: 2025-01-09
Payer: COMMERCIAL

## 2025-01-09 ENCOUNTER — RESULT REVIEW (OUTPATIENT)
Age: 65
End: 2025-01-09

## 2025-01-09 ENCOUNTER — APPOINTMENT (OUTPATIENT)
Dept: ULTRASOUND IMAGING | Facility: CLINIC | Age: 65
End: 2025-01-09
Payer: COMMERCIAL

## 2025-01-09 PROCEDURE — 76641 ULTRASOUND BREAST COMPLETE: CPT | Mod: 50

## 2025-01-09 PROCEDURE — 77067 SCR MAMMO BI INCL CAD: CPT

## 2025-01-09 PROCEDURE — 77063 BREAST TOMOSYNTHESIS BI: CPT

## 2025-01-15 NOTE — ASU PATIENT PROFILE, ADULT - CENTRAL VENOUS CATHETER
What Type Of Note Output Would You Prefer (Optional)?: Standard Output Hpi Title: Evaluation of Skin Lesions How Severe Are Your Spot(S)?: mild no

## 2025-01-16 ENCOUNTER — INPATIENT (INPATIENT)
Facility: HOSPITAL | Age: 65
LOS: 1 days | Discharge: HOME | End: 2025-01-18
Attending: THORACIC SURGERY (CARDIOTHORACIC VASCULAR SURGERY) | Admitting: THORACIC SURGERY (CARDIOTHORACIC VASCULAR SURGERY)
Payer: COMMERCIAL

## 2025-01-16 ENCOUNTER — APPOINTMENT (OUTPATIENT)
Dept: THORACIC SURGERY | Facility: HOSPITAL | Age: 65
End: 2025-01-16

## 2025-01-16 VITALS
HEART RATE: 70 BPM | TEMPERATURE: 98 F | RESPIRATION RATE: 16 BRPM | HEIGHT: 60.5 IN | OXYGEN SATURATION: 97 % | SYSTOLIC BLOOD PRESSURE: 121 MMHG | DIASTOLIC BLOOD PRESSURE: 55 MMHG | WEIGHT: 147.05 LBS

## 2025-01-16 DIAGNOSIS — R91.8 OTHER NONSPECIFIC ABNORMAL FINDING OF LUNG FIELD: ICD-10-CM

## 2025-01-16 DIAGNOSIS — Z90.79 ACQUIRED ABSENCE OF OTHER GENITAL ORGAN(S): Chronic | ICD-10-CM

## 2025-01-16 DIAGNOSIS — Z98.890 OTHER SPECIFIED POSTPROCEDURAL STATES: Chronic | ICD-10-CM

## 2025-01-16 LAB
GRAM STN FLD: SIGNIFICANT CHANGE UP
GRAM STN FLD: SIGNIFICANT CHANGE UP
SPECIMEN SOURCE: SIGNIFICANT CHANGE UP
SPECIMEN SOURCE: SIGNIFICANT CHANGE UP

## 2025-01-16 PROCEDURE — 32666 THORACOSCOPY W/WEDGE RESECT: CPT | Mod: RT

## 2025-01-16 PROCEDURE — 88332 PATH CONSLTJ SURG EA ADD BLK: CPT | Mod: 26

## 2025-01-16 PROCEDURE — 71045 X-RAY EXAM CHEST 1 VIEW: CPT | Mod: 26

## 2025-01-16 PROCEDURE — 32666 THORACOSCOPY W/WEDGE RESECT: CPT | Mod: 80,RT

## 2025-01-16 PROCEDURE — 88309 TISSUE EXAM BY PATHOLOGIST: CPT | Mod: 26

## 2025-01-16 PROCEDURE — 88312 SPECIAL STAINS GROUP 1: CPT | Mod: 26

## 2025-01-16 PROCEDURE — 88331 PATH CONSLTJ SURG 1 BLK 1SPC: CPT | Mod: 26

## 2025-01-16 PROCEDURE — 31622 DX BRONCHOSCOPE/WASH: CPT

## 2025-01-16 PROCEDURE — 99233 SBSQ HOSP IP/OBS HIGH 50: CPT

## 2025-01-16 DEVICE — STAPLER ETHICON GST ECHELON 60MM GREEN RELOAD: Type: IMPLANTABLE DEVICE | Site: RIGHT | Status: FUNCTIONAL

## 2025-01-16 DEVICE — STAPLER ETHICON GST ECHELON 60MM BLACK RELOAD: Type: IMPLANTABLE DEVICE | Site: RIGHT | Status: FUNCTIONAL

## 2025-01-16 DEVICE — PROGEL PLEURAL AIR LEAK 4ML: Type: IMPLANTABLE DEVICE | Site: RIGHT | Status: FUNCTIONAL

## 2025-01-16 DEVICE — CHEST DRAIN THORACIC ARGYLE PVC 20FR STRAIGHT: Type: IMPLANTABLE DEVICE | Site: RIGHT | Status: FUNCTIONAL

## 2025-01-16 DEVICE — STAPLER ETHICON ECHELON ENDOPATH 60MM: Type: IMPLANTABLE DEVICE | Site: RIGHT | Status: FUNCTIONAL

## 2025-01-16 RX ORDER — HYDROMORPHONE HCL 4 MG
0.5 TABLET ORAL
Refills: 0 | Status: DISCONTINUED | OUTPATIENT
Start: 2025-01-16 | End: 2025-01-17

## 2025-01-16 RX ORDER — NALBUPHINE HYDROCHLORIDE 20 MG/ML
2.5 INJECTION INTRAMUSCULAR; INTRAVENOUS; SUBCUTANEOUS EVERY 6 HOURS
Refills: 0 | Status: DISCONTINUED | OUTPATIENT
Start: 2025-01-16 | End: 2025-01-17

## 2025-01-16 RX ORDER — ACETAMINOPHEN 80 MG/.8ML
750 SOLUTION/ DROPS ORAL EVERY 6 HOURS
Refills: 0 | Status: DISCONTINUED | OUTPATIENT
Start: 2025-01-16 | End: 2025-01-17

## 2025-01-16 RX ORDER — NALOXONE HCL 0.4 MG/ML
0.1 VIAL (ML) INJECTION
Refills: 0 | Status: DISCONTINUED | OUTPATIENT
Start: 2025-01-16 | End: 2025-01-18

## 2025-01-16 RX ORDER — ALBUTEROL SULFATE 90 UG/1
2.5 INHALANT RESPIRATORY (INHALATION) EVERY 6 HOURS
Refills: 0 | Status: DISCONTINUED | OUTPATIENT
Start: 2025-01-16 | End: 2025-01-18

## 2025-01-16 RX ORDER — SODIUM CHLORIDE 9 MG/ML
4 INJECTION, SOLUTION INTRAMUSCULAR; INTRAVENOUS; SUBCUTANEOUS EVERY 6 HOURS
Refills: 0 | Status: DISCONTINUED | OUTPATIENT
Start: 2025-01-16 | End: 2025-01-18

## 2025-01-16 RX ORDER — ONDANSETRON 4 MG/1
4 TABLET ORAL EVERY 6 HOURS
Refills: 0 | Status: DISCONTINUED | OUTPATIENT
Start: 2025-01-16 | End: 2025-01-18

## 2025-01-16 RX ORDER — ATORVASTATIN CALCIUM 40 MG/1
20 TABLET, FILM COATED ORAL AT BEDTIME
Refills: 0 | Status: DISCONTINUED | OUTPATIENT
Start: 2025-01-16 | End: 2025-01-18

## 2025-01-16 RX ORDER — LEVOTHYROXINE SODIUM 175 UG/1
137 TABLET ORAL DAILY
Refills: 0 | Status: DISCONTINUED | OUTPATIENT
Start: 2025-01-16 | End: 2025-01-18

## 2025-01-16 RX ORDER — FLUTICASONE PROPIONATE AND SALMETEROL 50; 500 UG/1; UG/1
1 POWDER ORAL; RESPIRATORY (INHALATION)
Refills: 0 | Status: DISCONTINUED | OUTPATIENT
Start: 2025-01-16 | End: 2025-01-18

## 2025-01-16 RX ORDER — SENNOSIDES 8.6 MG/1
1 TABLET, FILM COATED ORAL DAILY
Refills: 0 | Status: DISCONTINUED | OUTPATIENT
Start: 2025-01-16 | End: 2025-01-18

## 2025-01-16 RX ORDER — ACETAMINOPHEN 80 MG/.8ML
975 SOLUTION/ DROPS ORAL ONCE
Refills: 0 | Status: COMPLETED | OUTPATIENT
Start: 2025-01-16 | End: 2025-01-16

## 2025-01-16 RX ORDER — HEPARIN SODIUM 1000 [USP'U]/ML
5000 INJECTION, SOLUTION INTRAVENOUS; SUBCUTANEOUS EVERY 8 HOURS
Refills: 0 | Status: DISCONTINUED | OUTPATIENT
Start: 2025-01-16 | End: 2025-01-16

## 2025-01-16 RX ORDER — DORNASE ALFA 1 MG/ML
2.5 SOLUTION RESPIRATORY (INHALATION) DAILY
Refills: 0 | Status: DISCONTINUED | OUTPATIENT
Start: 2025-01-16 | End: 2025-01-18

## 2025-01-16 RX ORDER — IPRATROPIUM BROMIDE AND ALBUTEROL SULFATE .5; 2.5 MG/3ML; MG/3ML
3 SOLUTION RESPIRATORY (INHALATION) EVERY 6 HOURS
Refills: 0 | Status: DISCONTINUED | OUTPATIENT
Start: 2025-01-16 | End: 2025-01-16

## 2025-01-16 RX ORDER — HYDROMORPHONE HCL 4 MG
30 TABLET ORAL
Refills: 0 | Status: DISCONTINUED | OUTPATIENT
Start: 2025-01-16 | End: 2025-01-17

## 2025-01-16 RX ORDER — CHLORHEXIDINE GLUCONATE 1.2 MG/ML
1 RINSE ORAL DAILY
Refills: 0 | Status: DISCONTINUED | OUTPATIENT
Start: 2025-01-16 | End: 2025-01-18

## 2025-01-16 RX ORDER — SODIUM CHLORIDE 9 MG/ML
4 INJECTION, SOLUTION INTRAMUSCULAR; INTRAVENOUS; SUBCUTANEOUS EVERY 12 HOURS
Refills: 0 | Status: DISCONTINUED | OUTPATIENT
Start: 2025-01-16 | End: 2025-01-16

## 2025-01-16 RX ORDER — HEPARIN SODIUM 1000 [USP'U]/ML
5000 INJECTION, SOLUTION INTRAVENOUS; SUBCUTANEOUS ONCE
Refills: 0 | Status: COMPLETED | OUTPATIENT
Start: 2025-01-16 | End: 2025-01-16

## 2025-01-16 RX ADMIN — SODIUM CHLORIDE 30 MILLILITER(S): 9 INJECTION, SOLUTION INTRAVENOUS at 15:04

## 2025-01-16 RX ADMIN — ONDANSETRON 4 MILLIGRAM(S): 4 TABLET ORAL at 21:40

## 2025-01-16 RX ADMIN — Medication 30 MILLILITER(S): at 19:33

## 2025-01-16 RX ADMIN — SODIUM CHLORIDE 30 MILLILITER(S): 9 INJECTION, SOLUTION INTRAVENOUS at 08:45

## 2025-01-16 RX ADMIN — ATORVASTATIN CALCIUM 20 MILLIGRAM(S): 40 TABLET, FILM COATED ORAL at 21:28

## 2025-01-16 RX ADMIN — SODIUM CHLORIDE 4 MILLILITER(S): 9 INJECTION, SOLUTION INTRAMUSCULAR; INTRAVENOUS; SUBCUTANEOUS at 21:12

## 2025-01-16 RX ADMIN — Medication 30 MILLILITER(S): at 12:28

## 2025-01-16 RX ADMIN — ALBUTEROL SULFATE 2.5 MILLIGRAM(S): 90 INHALANT RESPIRATORY (INHALATION) at 21:12

## 2025-01-16 RX ADMIN — HEPARIN SODIUM 5000 UNIT(S): 1000 INJECTION, SOLUTION INTRAVENOUS; SUBCUTANEOUS at 08:44

## 2025-01-16 RX ADMIN — Medication 30 MILLILITER(S): at 14:49

## 2025-01-16 RX ADMIN — SODIUM CHLORIDE 30 MILLILITER(S): 9 INJECTION, SOLUTION INTRAVENOUS at 21:37

## 2025-01-16 RX ADMIN — ACETAMINOPHEN 975 MILLIGRAM(S): 80 SOLUTION/ DROPS ORAL at 08:44

## 2025-01-16 NOTE — PROGRESS NOTE ADULT - SUBJECTIVE AND OBJECTIVE BOX
CHIEF COMPLAINT: FOLLOW UP IN ICU FOR POSTOPERATIVE CARE OF PATIENT WHO IS S/P LUNG RESECTION      PROCEDURES:  uniportal R VATS, RUL wedge resection 16-Jan-2025       ISSUES:   Necrotizing granuloma of lung  Hematoma of R lung  Lung nodule  Post op pain  Chest tube in place  CAD (not on antiplatelet)  Carotid atherosclerosis (not on antiplatelet)  Paroxysmal atrial fibrillation (on Eliquis)  COPD  MARIA FERNANDA  Mitral regurgitation  HTN  HLD  Hypothyroidism  Hx of L breast mass s/p L lumpectomy       INTERVAL EVENTS:   OR today. Extubated in OR. Transferred to CTICU.    Post op CXR notable for RUL hematoma  Repeat CXR stable hematoma    HISTORY:   Patient reports moderate pain at chest wall incision sites which is worse with coughing and deep breathing without associated fever or dyspnea. Pain is improved with use of pain meds.     PHYSICAL EXAM:   Gen: Comfortable, No acute distress  Eyes: Sclera white, Conjunctiva normal, Eyelids normal, Pupils symmetrical   ENT: Mucous membranes moist,  ,  ,    Neck: Trachea midline,  ,  ,  ,  ,  ,    CV: Rate regular, Rhythm regular,  ,  ,    Resp: Breath sounds coarse, No accessory muscles use, R chest tube in place,  ,    Abd: Soft, Non-distended, Non-tender,   ,  ,  ,    Skin: Warm, No peripheral edema of lower extremities,  ,    : No putnam  Neuro: Moving all 4 extremities,    Psych: A&Ox3      ASSESSMENT AND PLAN:     NEURO:  Post-operative Pain - Stable. Pain control with PCA and Tylenol IV PRN.          RESPIRATORY:  Hypoxia - Wean nasal cannula for goal O2sat above 92. Obtain CXR. Incentive spirometry. Chest PT and frequent suctioning. Continue bronchodilators. OOB to chair & ambulate w/ assistance. Continuous pulse oximetry for support & to prevent decompensation.       COPD - worsened by recent thoracic surgery.   - Start albuterol nebs  - Start hypertonic saline nebs          MARIA FERNANDA - stable. Not on CPAP at home.   - Monitor nocturnal O2sat    CARDIOVASCULAR:  Hemodynamically stable - Not on pressors. Continue hemodynamic monitoring.  Telemetry (medical test) - Reviewed by me today independently. Normal sinus rhythm.    HTN - stable. Hold home antihypertensives for now.     Paroxysmal Afib - stable.  - Hold anticoagulation until chest tube removed.    Mitral regurgitation - stable.  - continue BP and rate control    CAD - not on antiplatelet at home. stable.    Carotid atherosclerosis - not on antiplatelet at home. stable.      RENAL:  Stable - Monitor IOs and electrolytes. Keep K above 4.0 and Mg above 2.0.         GASTROINTESTINAL:  GI prophylaxis not indicated  Zofran and Reglan IV PRN for nausea  Regular consistency diet            HEMATOLOGIC:  No signs of active bleeding. Monitor Hgb in CBC in AM  DVT prophylaxis with heparin subQ         INFECTIOUS DISEASE:    Necrotizing granuloma on biopsy - stable.   - Follow up OR cultures. Continue airborne isolation.  - Check Quant Gold  - AFB sputum         ENDOCRINE:  Stable – Monitor glucose fingersticks for goal 120-180.  Hypothyroidism - stable. Continue Synthroid.         ONCOLOGY:  Lung nodule - Improved. S/P resection. Follow up final pathology.  - Chest tube – Pleurevac water seal. Monitor chest tube output. Repeat CXR today.        Pertinent clinical, laboratory, radiographic, hemodynamic, echocardiographic, respiratory data, microbiologic data and chart were reviewed by myself and analyzed frequently throughout the course of the day and night by myself.    Plan discussed at length with the CTICU staff and Attending CT Surgeon -   Dr Hi Us.      Patient's status was discussed with patient at bedside.       	  I have spent 50 minutes of time with this patient monitoring hemodynamic status, respiratory status, and coordinating care in the ICU.    ________________________________________________      _________________________  VITAL SIGNS:  Vital Signs Last 24 Hrs  T(C): 36.6 (16 Jan 2025 20:00), Max: 37 (16 Jan 2025 16:00)  T(F): 97.9 (16 Jan 2025 20:00), Max: 98.6 (16 Jan 2025 16:00)  HR: 62 (16 Jan 2025 20:00) (53 - 70)  BP: 122/62 (16 Jan 2025 20:00) (104/55 - 133/62)  BP(mean): 79 (16 Jan 2025 20:00) (47 - 91)  RR: 16 (16 Jan 2025 20:00) (12 - 20)  SpO2: 98% (16 Jan 2025 20:00) (93% - 100%)    Parameters below as of 16 Jan 2025 20:00  Patient On (Oxygen Delivery Method): nasal cannula w/ humidification  O2 Flow (L/min): 2    I/Os:   I&O's Detail    16 Jan 2025 07:01  -  16 Jan 2025 20:24  --------------------------------------------------------  IN:    Lactated Ringers: 270 mL    Oral Fluid: 300 mL  Total IN: 570 mL    OUT:    Chest Tube (mL): 10 mL  Total OUT: 10 mL    Total NET: 560 mL              MEDICATIONS:  MEDICATIONS  (STANDING):  acetaminophen   IVPB .. 750 milliGRAM(s) IV Intermittent every 6 hours  albuterol    0.083% 2.5 milliGRAM(s) Nebulizer every 6 hours  atorvastatin 20 milliGRAM(s) Oral at bedtime  chlorhexidine 2% Cloths 1 Application(s) Topical daily  dornase frederick Solution 2.5 milliGRAM(s) Inhalation daily  fluticasone propionate/ salmeterol 500-50 MICROgram(s) Diskus 1 Dose(s) Inhalation two times a day  HYDROmorphone PCA (1 mG/mL) 30 milliLiter(s) PCA Continuous PCA Continuous  lactated ringers. 1000 milliLiter(s) (30 mL/Hr) IV Continuous <Continuous>  levothyroxine 137 MICROGram(s) Oral daily  senna 1 Tablet(s) Oral daily  sodium chloride 3%  Inhalation 4 milliLiter(s) Inhalation every 6 hours    MEDICATIONS  (PRN):  HYDROmorphone PCA (1 mG/mL) Rescue Clinician Bolus 0.5 milliGRAM(s) IV Push every 15 minutes PRN for Pain Scale GREATER THAN 6  nalbuphine Injectable 2.5 milliGRAM(s) IV Push every 6 hours PRN Pruritus  naloxone Injectable 0.1 milliGRAM(s) IV Push every 3 minutes PRN For ANY of the following changes in patient status:  A. RR LESS THAN 10 breaths per minute, B. Oxygen saturation LESS THAN 90%, C. Sedation score of 6  ondansetron Injectable 4 milliGRAM(s) IV Push every 6 hours PRN Nausea      LABS:  Pre-op Laboratory data was independently reviewed by stu warner.     1/6/25 - Hgb 14.8, Plt 164, Cr 0.58                  RADIOLOGY:   Radiology images were independently reviewed by me today. Reports were reviewed by me today.    Xray Chest 1 View- PORTABLE-Urgent:   ACC: [id number] EXAM:  XR CHEST PORTABLE URGENT 1V   ORDERED BY: AFUA RONQUILLO     PROCEDURE DATE:  01/16/2025          INTERPRETATION:  CLINICAL INFORMATION: Thoracotomy    TIME OF EXAMINATION: January 16, 2025 at 12:01 PM    EXAM: Portable chest    FINDINGS:    Right-sided chest tube with tip overlying the apex.  Large opacity overlies the chain sutures likely hematoma.  Remainder of both lungs are clear.  No effusions or pneumothorax.        COMPARISON: December 12, 2024        IMPRESSION: Status post right thoracotomy with chest tube.    --- End of Report ---            HIREN OATES MD; Attending Radiologist  This document has been electronically signed. Jan 16 2025  2:01PM (01-16-25 @ 12:04)   	    CHIEF COMPLAINT: FOLLOW UP IN ICU FOR POSTOPERATIVE CARE OF PATIENT WHO IS S/P LUNG RESECTION      PROCEDURES:  uniportal R VATS, RUL wedge resection 16-Jan-2025       ISSUES:   Necrotizing granuloma of lung  Hematoma of R lung  Lung nodule  Post op pain  Chest tube in place  CAD (not on antiplatelet)  Carotid atherosclerosis (not on antiplatelet)  Paroxysmal atrial fibrillation (on Eliquis)  COPD  MARIA FERNANDA  Mitral regurgitation  HTN  HLD  Hypothyroidism  Hx of L breast mass s/p L lumpectomy       INTERVAL EVENTS:   OR today. Extubated in OR. Transferred to CTICU.    Post op CXR notable for RUL hematoma  Repeat CXR stable hematoma    HISTORY:   Patient reports moderate pain at chest wall incision sites which is worse with coughing and deep breathing without associated fever or dyspnea. Pain is improved with use of pain meds.     PHYSICAL EXAM:   Gen: Comfortable, No acute distress  Eyes: Sclera white, Conjunctiva normal, Eyelids normal, Pupils symmetrical   ENT: Mucous membranes moist,  ,  ,    Neck: Trachea midline,  ,  ,  ,  ,  ,    CV: Rate regular, Rhythm regular,  ,  ,    Resp: Breath sounds coarse, No accessory muscles use, R chest tube in place,  ,    Abd: Soft, Non-distended, Non-tender,   ,  ,  ,    Skin: Warm, No peripheral edema of lower extremities,  ,    : No putnam  Neuro: Moving all 4 extremities,    Psych: A&Ox3      ASSESSMENT AND PLAN:     NEURO:  Post-operative Pain - Stable. Pain control with PCA and Tylenol IV PRN.          RESPIRATORY:  Hypoxia - Wean nasal cannula for goal O2sat above 92. Obtain CXR. Incentive spirometry. Chest PT and frequent suctioning. Continue bronchodilators. OOB to chair & ambulate w/ assistance. Continuous pulse oximetry for support & to prevent decompensation.       COPD - worsened by recent thoracic surgery.   - Start albuterol nebs  - Start hypertonic saline nebs          MARIA FERNANDA - stable. Not on CPAP at home.   - Monitor nocturnal O2sat    RUL hematoma - worsening  - Monitor chest tube output  - Follow Hgb  - Avoid toradol  - Hold heparin subQ per Thoracic Surgery     CARDIOVASCULAR:  Hemodynamically stable - Not on pressors. Continue hemodynamic monitoring.  Telemetry (medical test) - Reviewed by me today independently. Normal sinus rhythm.    HTN - stable. Hold home antihypertensives for now.     Paroxysmal Afib - stable.  - Hold anticoagulation until chest tube removed.    Mitral regurgitation - stable.  - continue BP and rate control    CAD - not on antiplatelet at home. stable.    Carotid atherosclerosis - not on antiplatelet at home. stable.      RENAL:  Stable - Monitor IOs and electrolytes. Keep K above 4.0 and Mg above 2.0.         GASTROINTESTINAL:  GI prophylaxis not indicated  Zofran and Reglan IV PRN for nausea  Regular consistency diet            HEMATOLOGIC:  No signs of active bleeding. Monitor Hgb in CBC in AM  DVT prophylaxis with heparin subQ         INFECTIOUS DISEASE:    Necrotizing granuloma on biopsy - stable.   - Follow up OR cultures. Continue airborne isolation.  - Check Quant Gold  - AFB sputum         ENDOCRINE:  Stable – Monitor glucose fingersticks for goal 120-180.  Hypothyroidism - stable. Continue Synthroid.         ONCOLOGY:  Lung nodule - Improved. S/P resection. Follow up final pathology.  - Chest tube – Pleurevac water seal. Monitor chest tube output. Repeat CXR today.        Pertinent clinical, laboratory, radiographic, hemodynamic, echocardiographic, respiratory data, microbiologic data and chart were reviewed by myself and analyzed frequently throughout the course of the day and night by myself.    Plan discussed at length with the CTICU staff and Attending CT Surgeon -   Dr Hi Us.      Patient's status was discussed with patient at bedside.       	  I have spent 50 minutes of time with this patient monitoring hemodynamic status, respiratory status, and coordinating care in the ICU.    ________________________________________________      _________________________  VITAL SIGNS:  Vital Signs Last 24 Hrs  T(C): 36.6 (16 Jan 2025 20:00), Max: 37 (16 Jan 2025 16:00)  T(F): 97.9 (16 Jan 2025 20:00), Max: 98.6 (16 Jan 2025 16:00)  HR: 62 (16 Jan 2025 20:00) (53 - 70)  BP: 122/62 (16 Jan 2025 20:00) (104/55 - 133/62)  BP(mean): 79 (16 Jan 2025 20:00) (47 - 91)  RR: 16 (16 Jan 2025 20:00) (12 - 20)  SpO2: 98% (16 Jan 2025 20:00) (93% - 100%)    Parameters below as of 16 Jan 2025 20:00  Patient On (Oxygen Delivery Method): nasal cannula w/ humidification  O2 Flow (L/min): 2    I/Os:   I&O's Detail    16 Jan 2025 07:01  -  16 Jan 2025 20:24  --------------------------------------------------------  IN:    Lactated Ringers: 270 mL    Oral Fluid: 300 mL  Total IN: 570 mL    OUT:    Chest Tube (mL): 10 mL  Total OUT: 10 mL    Total NET: 560 mL              MEDICATIONS:  MEDICATIONS  (STANDING):  acetaminophen   IVPB .. 750 milliGRAM(s) IV Intermittent every 6 hours  albuterol    0.083% 2.5 milliGRAM(s) Nebulizer every 6 hours  atorvastatin 20 milliGRAM(s) Oral at bedtime  chlorhexidine 2% Cloths 1 Application(s) Topical daily  dornase frederick Solution 2.5 milliGRAM(s) Inhalation daily  fluticasone propionate/ salmeterol 500-50 MICROgram(s) Diskus 1 Dose(s) Inhalation two times a day  HYDROmorphone PCA (1 mG/mL) 30 milliLiter(s) PCA Continuous PCA Continuous  lactated ringers. 1000 milliLiter(s) (30 mL/Hr) IV Continuous <Continuous>  levothyroxine 137 MICROGram(s) Oral daily  senna 1 Tablet(s) Oral daily  sodium chloride 3%  Inhalation 4 milliLiter(s) Inhalation every 6 hours    MEDICATIONS  (PRN):  HYDROmorphone PCA (1 mG/mL) Rescue Clinician Bolus 0.5 milliGRAM(s) IV Push every 15 minutes PRN for Pain Scale GREATER THAN 6  nalbuphine Injectable 2.5 milliGRAM(s) IV Push every 6 hours PRN Pruritus  naloxone Injectable 0.1 milliGRAM(s) IV Push every 3 minutes PRN For ANY of the following changes in patient status:  A. RR LESS THAN 10 breaths per minute, B. Oxygen saturation LESS THAN 90%, C. Sedation score of 6  ondansetron Injectable 4 milliGRAM(s) IV Push every 6 hours PRN Nausea      LABS:  Pre-op Laboratory data was independently reviewed by me today.     1/6/25 - Hgb 14.8, Plt 164, Cr 0.58                  RADIOLOGY:   Radiology images were independently reviewed by me today. Reports were reviewed by me today.    Xray Chest 1 View- PORTABLE-Urgent:   ACC: [id number] EXAM:  XR CHEST PORTABLE URGENT 1V   ORDERED BY: AFUA RONQUILLO     PROCEDURE DATE:  01/16/2025          INTERPRETATION:  CLINICAL INFORMATION: Thoracotomy    TIME OF EXAMINATION: January 16, 2025 at 12:01 PM    EXAM: Portable chest    FINDINGS:    Right-sided chest tube with tip overlying the apex.  Large opacity overlies the chain sutures likely hematoma.  Remainder of both lungs are clear.  No effusions or pneumothorax.        COMPARISON: December 12, 2024        IMPRESSION: Status post right thoracotomy with chest tube.    --- End of Report ---            HIREN OATES MD; Attending Radiologist  This document has been electronically signed. Jan 16 2025  2:01PM (01-16-25 @ 12:04)

## 2025-01-16 NOTE — ASU PREOP CHECKLIST - INTERNAL PROSTHESES
"Debridement    Date/Time: 12/5/2023 2:13 PM    Performed by: Ronald Barcenas MD  Authorized by: Ronald Barcenas MD  Associated wounds:        Altered Skin Integrity 01/12/23 1530 Sacral spine Full thickness tissue loss with exposed bone, tendon, or muscle. Often includes undermining and tunneling. May extend into muscle and/or supporting structures.  Time out: Immediately prior to procedure a "time out" was called to verify the correct patient, procedure, equipment, support staff and site/side marked as required.    Consent Done?:  Yes (Written)    Preparation: Patient was prepped and draped with clean technique    Local anesthesia used?: No      Wound Details:    Location:  Sacrum    Type of Debridement:  Non-excisional       Length (cm):  9       Area (sq cm):  76.5       Width (cm):  8.5       Percent Debrided (%):  75       Depth (cm):  1.5       Total Area Debrided (sq cm):  57.38    Depth of debridement:  Muscle/fascia/tendon    Tissue debrided:  Subcutaneous, Muscle and Fascia    Devitalized tissue debrided:  Necrotic/Eschar, Slough and Fibrin    Instruments:  Blade, Curette, Forceps and Scissors  Bleeding:  Minimal  Hemostasis Achieved: Yes  Method Used:  Pressure  Patient tolerance:  Patient tolerated the procedure well with no immediate complications    " no

## 2025-01-17 LAB
ANION GAP SERPL CALC-SCNC: 12 MMOL/L — SIGNIFICANT CHANGE UP (ref 7–14)
APTT BLD: 22.7 SEC — LOW (ref 24.5–35.6)
BASOPHILS # BLD AUTO: 0.03 K/UL — SIGNIFICANT CHANGE UP (ref 0–0.2)
BASOPHILS NFR BLD AUTO: 0.2 % — SIGNIFICANT CHANGE UP (ref 0–2)
BLD GP AB SCN SERPL QL: NEGATIVE — SIGNIFICANT CHANGE UP
BUN SERPL-MCNC: 17 MG/DL — SIGNIFICANT CHANGE UP (ref 7–23)
CALCIUM SERPL-MCNC: 9.1 MG/DL — SIGNIFICANT CHANGE UP (ref 8.4–10.5)
CHLORIDE SERPL-SCNC: 98 MMOL/L — SIGNIFICANT CHANGE UP (ref 98–107)
CO2 SERPL-SCNC: 24 MMOL/L — SIGNIFICANT CHANGE UP (ref 22–31)
CREAT SERPL-MCNC: 0.58 MG/DL — SIGNIFICANT CHANGE UP (ref 0.5–1.3)
EGFR: 102 ML/MIN/1.73M2 — SIGNIFICANT CHANGE UP
EOSINOPHIL # BLD AUTO: 0 K/UL — SIGNIFICANT CHANGE UP (ref 0–0.5)
EOSINOPHIL NFR BLD AUTO: 0 % — SIGNIFICANT CHANGE UP (ref 0–6)
FIBRINOGEN PPP-MCNC: 439 MG/DL — SIGNIFICANT CHANGE UP (ref 200–465)
GLUCOSE SERPL-MCNC: 112 MG/DL — HIGH (ref 70–99)
HCT VFR BLD CALC: 41.3 % — SIGNIFICANT CHANGE UP (ref 34.5–45)
HGB BLD-MCNC: 13.9 G/DL — SIGNIFICANT CHANGE UP (ref 11.5–15.5)
IANC: 15.12 K/UL — HIGH (ref 1.8–7.4)
IMM GRANULOCYTES NFR BLD AUTO: 0.5 % — SIGNIFICANT CHANGE UP (ref 0–0.9)
INR BLD: <0.9 RATIO — SIGNIFICANT CHANGE UP (ref 0.85–1.16)
LMWH PPP CHRO-ACNC: 0.05 IU/ML — LOW (ref 0.5–1.1)
LYMPHOCYTES # BLD AUTO: 1.06 K/UL — SIGNIFICANT CHANGE UP (ref 1–3.3)
LYMPHOCYTES # BLD AUTO: 6.2 % — LOW (ref 13–44)
MAGNESIUM SERPL-MCNC: 1.9 MG/DL — SIGNIFICANT CHANGE UP (ref 1.6–2.6)
MCHC RBC-ENTMCNC: 32.6 PG — SIGNIFICANT CHANGE UP (ref 27–34)
MCHC RBC-ENTMCNC: 33.7 G/DL — SIGNIFICANT CHANGE UP (ref 32–36)
MCV RBC AUTO: 96.9 FL — SIGNIFICANT CHANGE UP (ref 80–100)
MONOCYTES # BLD AUTO: 0.81 K/UL — SIGNIFICANT CHANGE UP (ref 0–0.9)
MONOCYTES NFR BLD AUTO: 4.7 % — SIGNIFICANT CHANGE UP (ref 2–14)
MRSA PCR RESULT.: SIGNIFICANT CHANGE UP
NEUTROPHILS # BLD AUTO: 15.12 K/UL — HIGH (ref 1.8–7.4)
NEUTROPHILS NFR BLD AUTO: 88.4 % — HIGH (ref 43–77)
NIGHT BLUE STAIN TISS: SIGNIFICANT CHANGE UP
NRBC # BLD: 0 /100 WBCS — SIGNIFICANT CHANGE UP (ref 0–0)
NRBC # FLD: 0 K/UL — SIGNIFICANT CHANGE UP (ref 0–0)
PHOSPHATE SERPL-MCNC: 3.7 MG/DL — SIGNIFICANT CHANGE UP (ref 2.5–4.5)
PLATELET # BLD AUTO: 140 K/UL — LOW (ref 150–400)
POTASSIUM SERPL-MCNC: 4.5 MMOL/L — SIGNIFICANT CHANGE UP (ref 3.5–5.3)
POTASSIUM SERPL-SCNC: 4.5 MMOL/L — SIGNIFICANT CHANGE UP (ref 3.5–5.3)
PROTHROM AB SERPL-ACNC: 10.1 SEC — SIGNIFICANT CHANGE UP (ref 9.9–13.4)
RBC # BLD: 4.26 M/UL — SIGNIFICANT CHANGE UP (ref 3.8–5.2)
RBC # FLD: 13.2 % — SIGNIFICANT CHANGE UP (ref 10.3–14.5)
RH IG SCN BLD-IMP: NEGATIVE — SIGNIFICANT CHANGE UP
S AUREUS DNA NOSE QL NAA+PROBE: SIGNIFICANT CHANGE UP
SODIUM SERPL-SCNC: 134 MMOL/L — LOW (ref 135–145)
SPECIMEN SOURCE: SIGNIFICANT CHANGE UP
WBC # BLD: 17.11 K/UL — HIGH (ref 3.8–10.5)
WBC # FLD AUTO: 17.11 K/UL — HIGH (ref 3.8–10.5)

## 2025-01-17 PROCEDURE — 99233 SBSQ HOSP IP/OBS HIGH 50: CPT

## 2025-01-17 PROCEDURE — 71045 X-RAY EXAM CHEST 1 VIEW: CPT | Mod: 26,76

## 2025-01-17 RX ORDER — HYDROMORPHONE HCL 4 MG
0.3 TABLET ORAL
Refills: 0 | Status: DISCONTINUED | OUTPATIENT
Start: 2025-01-17 | End: 2025-01-18

## 2025-01-17 RX ORDER — GINKGO BILOBA 40 MG
6 CAPSULE ORAL AT BEDTIME
Refills: 0 | Status: DISCONTINUED | OUTPATIENT
Start: 2025-01-17 | End: 2025-01-18

## 2025-01-17 RX ORDER — OXYCODONE HCL 15 MG
5 TABLET ORAL
Refills: 0 | Status: DISCONTINUED | OUTPATIENT
Start: 2025-01-17 | End: 2025-01-18

## 2025-01-17 RX ORDER — METOCLOPRAMIDE 10 MG/1
10 TABLET ORAL EVERY 6 HOURS
Refills: 0 | Status: DISCONTINUED | OUTPATIENT
Start: 2025-01-17 | End: 2025-01-18

## 2025-01-17 RX ORDER — ACETAMINOPHEN 80 MG/.8ML
650 SOLUTION/ DROPS ORAL EVERY 6 HOURS
Refills: 0 | Status: DISCONTINUED | OUTPATIENT
Start: 2025-01-17 | End: 2025-01-18

## 2025-01-17 RX ADMIN — Medication 5 MILLIGRAM(S): at 15:01

## 2025-01-17 RX ADMIN — ATORVASTATIN CALCIUM 20 MILLIGRAM(S): 40 TABLET, FILM COATED ORAL at 21:46

## 2025-01-17 RX ADMIN — SODIUM CHLORIDE 4 MILLILITER(S): 9 INJECTION, SOLUTION INTRAMUSCULAR; INTRAVENOUS; SUBCUTANEOUS at 16:39

## 2025-01-17 RX ADMIN — ACETAMINOPHEN 650 MILLIGRAM(S): 80 SOLUTION/ DROPS ORAL at 12:32

## 2025-01-17 RX ADMIN — ALBUTEROL SULFATE 2.5 MILLIGRAM(S): 90 INHALANT RESPIRATORY (INHALATION) at 10:52

## 2025-01-17 RX ADMIN — ALBUTEROL SULFATE 2.5 MILLIGRAM(S): 90 INHALANT RESPIRATORY (INHALATION) at 21:56

## 2025-01-17 RX ADMIN — LEVOTHYROXINE SODIUM 137 MICROGRAM(S): 175 TABLET ORAL at 06:19

## 2025-01-17 RX ADMIN — SODIUM CHLORIDE 4 MILLILITER(S): 9 INJECTION, SOLUTION INTRAMUSCULAR; INTRAVENOUS; SUBCUTANEOUS at 03:59

## 2025-01-17 RX ADMIN — SODIUM CHLORIDE 4 MILLILITER(S): 9 INJECTION, SOLUTION INTRAMUSCULAR; INTRAVENOUS; SUBCUTANEOUS at 21:57

## 2025-01-17 RX ADMIN — ONDANSETRON 4 MILLIGRAM(S): 4 TABLET ORAL at 08:34

## 2025-01-17 RX ADMIN — ALBUTEROL SULFATE 2.5 MILLIGRAM(S): 90 INHALANT RESPIRATORY (INHALATION) at 03:33

## 2025-01-17 RX ADMIN — METOCLOPRAMIDE 10 MILLIGRAM(S): 10 TABLET ORAL at 20:05

## 2025-01-17 RX ADMIN — SODIUM CHLORIDE 4 MILLILITER(S): 9 INJECTION, SOLUTION INTRAMUSCULAR; INTRAVENOUS; SUBCUTANEOUS at 10:52

## 2025-01-17 RX ADMIN — ACETAMINOPHEN 300 MILLIGRAM(S): 80 SOLUTION/ DROPS ORAL at 00:15

## 2025-01-17 RX ADMIN — ALBUTEROL SULFATE 2.5 MILLIGRAM(S): 90 INHALANT RESPIRATORY (INHALATION) at 16:38

## 2025-01-17 RX ADMIN — DORNASE ALFA 2.5 MILLIGRAM(S): 1 SOLUTION RESPIRATORY (INHALATION) at 10:52

## 2025-01-17 RX ADMIN — Medication 5 MILLIGRAM(S): at 14:15

## 2025-01-17 RX ADMIN — Medication 30 MILLILITER(S): at 08:27

## 2025-01-17 RX ADMIN — Medication 5 MILLIGRAM(S): at 19:40

## 2025-01-17 RX ADMIN — ACETAMINOPHEN 300 MILLIGRAM(S): 80 SOLUTION/ DROPS ORAL at 06:08

## 2025-01-17 RX ADMIN — Medication 5 MILLIGRAM(S): at 18:39

## 2025-01-17 NOTE — PROGRESS NOTE ADULT - SUBJECTIVE AND OBJECTIVE BOX
Anesthesia Pain Management Service    SUBJECTIVE: Patient states she has pain, but has not been pressing the IV PCA demand button because she didn't want to use too much.     Pain Scale Score	At rest: __8/10_ 	With Activity: ___ 	[X ] Refer to charted pain scores    THERAPY:    [ ] IV PCA Morphine		[ ] 5 mg/mL	[ ] 1 mg/mL  [X ] IV PCA Hydromorphone	[ ] 5 mg/mL	[X ] 1 mg/mL  [ ] IV PCA Fentanyl		[ ] 50 micrograms/mL    Demand dose __0.2_ lockout __6_ (minutes) Continuous Rate _0__ Total: __3.4_   mg used (in past 24 hrs)      MEDICATIONS  (STANDING):  acetaminophen     Tablet .. 650 milliGRAM(s) Oral every 6 hours  albuterol    0.083% 2.5 milliGRAM(s) Nebulizer every 6 hours  atorvastatin 20 milliGRAM(s) Oral at bedtime  chlorhexidine 2% Cloths 1 Application(s) Topical daily  dornase frederick Solution 2.5 milliGRAM(s) Inhalation daily  fluticasone propionate/ salmeterol 500-50 MICROgram(s) Diskus 1 Dose(s) Inhalation two times a day  lactated ringers. 1000 milliLiter(s) (30 mL/Hr) IV Continuous <Continuous>  levothyroxine 137 MICROGram(s) Oral daily  senna 1 Tablet(s) Oral daily  sodium chloride 3%  Inhalation 4 milliLiter(s) Inhalation every 6 hours    MEDICATIONS  (PRN):  HYDROmorphone  Injectable 0.3 milliGRAM(s) IV Push every 3 hours PRN Severe breaktkhrough Pain (7 - 10)  naloxone Injectable 0.1 milliGRAM(s) IV Push every 3 minutes PRN For ANY of the following changes in patient status:  A. RR LESS THAN 10 breaths per minute, B. Oxygen saturation LESS THAN 90%, C. Sedation score of 6  ondansetron Injectable 4 milliGRAM(s) IV Push every 6 hours PRN Nausea  oxyCODONE    IR 5 milliGRAM(s) Oral every 3 hours PRN Moderate to Severe Pain (4 - 10)      OBJECTIVE:  Patient is sitting up in chair tolerating regular diet.     Sedation Score:	[ X] Alert	[ ] Drowsy 	[ ] Arousable	[ ] Asleep	[ ] Unresponsive    Side Effects:	[X ] None	[ ] Nausea	[ ] Vomiting	[ ] Pruritus  		[ ] Other:    Vital Signs Last 24 Hrs  T(C): 36.8 (17 Jan 2025 08:00), Max: 37 (16 Jan 2025 16:00)  T(F): 98.2 (17 Jan 2025 08:00), Max: 98.6 (16 Jan 2025 16:00)  HR: 76 (17 Jan 2025 11:00) (53 - 76)  BP: 139/52 (17 Jan 2025 11:00) (104/55 - 147/57)  BP(mean): 77 (17 Jan 2025 11:00) (47 - 91)  RR: 17 (17 Jan 2025 11:00) (12 - 20)  SpO2: 100% (17 Jan 2025 11:00) (92% - 100%)    Parameters below as of 17 Jan 2025 07:00  Patient On (Oxygen Delivery Method): room air        ASSESSMENT/ PLAN    Therapy to  be:	[ ] Continue   [ X] Discontinued   [X ] Change to prn Analgesics    Documentation and Verification of current medications:   [X] Done	[ ] Not done, not elligible    Comments: Discussed patient with team and IV Dilaudid PCA discontinued. PRN Oral/IV opioids and/or Adjuvant non-opioid medication to be ordered at this point.    Progress Note written now but Patient was seen earlier.

## 2025-01-17 NOTE — PROGRESS NOTE ADULT - SUBJECTIVE AND OBJECTIVE BOX
VINICIO GARCIA                     [id number]    HPI:  64 year old female, current smoker (2PPD x50yrs),presents with hx of Carotid atherosclerosis,CAD (no intervention), emphysema ( denies recent exacerbation) , HLD, HTN, Hypothyroidism, Paroxysmal Afib on Eliquis, Mild MARIA FERNANDA  -no intervention had recent bronchoscopy 12/24 done for cough since 03/24 and new changes in PET/CT performed annually with  new RUL nodule with unclear etiology , path showed RUL non-diagnostic, LNs negative for malignant cells presents to PST today with pre op dx of mass of right lung is scheduled for robotic assisted , right video- assisted thorascopic surgery ( VATS) ,right upper lobe wedge resection possible lobectomy.     CHIEF COMPLAINT: FOLLOW UP IN ICU FOR POSTOPERATIVE CARE OF PATIENT WHO IS S/P LUNG RESECTION      PROCEDURES:  uniportal R VATS, RUL wedge resection 16-Jan-2025       ISSUES:   Necrotizing granuloma of lung  Hematoma of R lung  Lung nodule  Post op pain  Chest tube in place  CAD (not on antiplatelet)  Carotid atherosclerosis (not on antiplatelet)  Paroxysmal atrial fibrillation (on Eliquis)  COPD  MARIA FERNANDA  Mitral regurgitation  HTN  HLD  Hypothyroidism  Hx of L breast mass s/p L lumpectomy       PAST MEDICAL & SURGICAL HISTORY:  Carotid atherosclerosis      CAD (coronary artery disease)      HTN (hypertension)      HLD (hyperlipidemia)      Hypothyroidism      Emphysema lung      Mitral regurgitation      Atrial fibrillation      Heavy smoker      Central sleep apnea      Other nonspecific abnormal finding of lung field      Breast mass      Fallopian tube disorder      H/O unilateral salpingectomy      S/P lumpectomy, left breast                VITAL SIGNS:  Vital Signs Last 24 Hrs  T(C): 36.8 (17 Jan 2025 08:00), Max: 37 (16 Jan 2025 16:00)  T(F): 98.2 (17 Jan 2025 08:00), Max: 98.6 (16 Jan 2025 16:00)  HR: 76 (17 Jan 2025 11:00) (53 - 76)  BP: 139/52 (17 Jan 2025 11:00) (104/55 - 147/57)  BP(mean): 77 (17 Jan 2025 11:00) (47 - 91)  RR: 17 (17 Jan 2025 11:00) (12 - 20)  SpO2: 100% (17 Jan 2025 11:00) (92% - 100%)    Parameters below as of 17 Jan 2025 07:00  Patient On (Oxygen Delivery Method): room air        I/Os:   I&O's Detail    16 Jan 2025 07:01  -  17 Jan 2025 07:00  --------------------------------------------------------  IN:    IV PiggyBack: 150 mL    Lactated Ringers: 570 mL    Oral Fluid: 400 mL  Total IN: 1120 mL    OUT:    Chest Tube (mL): 15 mL    Voided (mL): 1100 mL  Total OUT: 1115 mL    Total NET: 5 mL      17 Jan 2025 07:01  -  17 Jan 2025 11:48  --------------------------------------------------------  IN:    Lactated Ringers: 120 mL    Oral Fluid: 200 mL  Total IN: 320 mL    OUT:    Chest Tube (mL): 0 mL  Total OUT: 0 mL    Total NET: 320 mL          CAPILLARY BLOOD GLUCOSE          =======================MEDICATIONS===================  MEDICATIONS  (STANDING):  acetaminophen     Tablet .. 650 milliGRAM(s) Oral every 6 hours  albuterol    0.083% 2.5 milliGRAM(s) Nebulizer every 6 hours  atorvastatin 20 milliGRAM(s) Oral at bedtime  chlorhexidine 2% Cloths 1 Application(s) Topical daily  dornase frederick Solution 2.5 milliGRAM(s) Inhalation daily  fluticasone propionate/ salmeterol 500-50 MICROgram(s) Diskus 1 Dose(s) Inhalation two times a day  lactated ringers. 1000 milliLiter(s) (30 mL/Hr) IV Continuous <Continuous>  levothyroxine 137 MICROGram(s) Oral daily  senna 1 Tablet(s) Oral daily  sodium chloride 3%  Inhalation 4 milliLiter(s) Inhalation every 6 hours    MEDICATIONS  (PRN):  HYDROmorphone  Injectable 0.3 milliGRAM(s) IV Push every 3 hours PRN Severe breaktkhrough Pain (7 - 10)  naloxone Injectable 0.1 milliGRAM(s) IV Push every 3 minutes PRN For ANY of the following changes in patient status:  A. RR LESS THAN 10 breaths per minute, B. Oxygen saturation LESS THAN 90%, C. Sedation score of 6  ondansetron Injectable 4 milliGRAM(s) IV Push every 6 hours PRN Nausea  oxyCODONE    IR 5 milliGRAM(s) Oral every 3 hours PRN Moderate to Severe Pain (4 - 10)    PHYSICAL EXAM============================  General:                         Awake, alert, not in any distress  Neuro:                            Moving all extremities to commands.   Respiratory:	Air entry fair and  bilateral conducted sounds                                           Effort even and unlabored.  CV:		Regular rate and rhythm. Normal S1/S2                                          Distal pulses present.  Abdomen:	                     Soft, non-distended. Bowel sounds present   Skin:		No rash.  Extremities:	Warm, no cyanosis or edema.  Palpable pulses    ============================LABS=========================                        13.9   17.11 )-----------( 140      ( 17 Jan 2025 03:00 )             41.3     01-17    134[L]  |  98  |  17  ----------------------------<  112[H]  4.5   |  24  |  0.58    Ca    9.1      17 Jan 2025 03:00  Phos  3.7     01-17  Mg     1.90     01-17        PT/INR - ( 17 Jan 2025 03:00 )   PT: 10.1 sec;   INR: <0.90 ratio         PTT - ( 17 Jan 2025 03:00 )  PTT:22.7 sec    Urinalysis Basic - ( 17 Jan 2025 03:00 )    Color: x / Appearance: x / SG: x / pH: x  Gluc: 112 mg/dL / Ketone: x  / Bili: x / Urobili: x   Blood: x / Protein: x / Nitrite: x   Leuk Esterase: x / RBC: x / WBC x   Sq Epi: x / Non Sq Epi: x / Bacteria: x      ASSESSMENT AND PLAN:     NEURO:  Post-operative Pain - Stable. Pain control with PCA and Tylenol IV PRN.       RESPIRATORY:  Hypoxia - Wean nasal cannula for goal O2sat above 92. Obtain CXR. Incentive spirometry. Chest PT and frequent suctioning. Continue bronchodilators. OOB to chair & ambulate w/ assistance. Continuous pulse oximetry for support & to prevent decompensation.       COPD - worsened by recent thoracic surgery.   - c/w albuterol nebs  - c/w hypertonic saline nebs          MARIA FERNANDA - stable. Not on CPAP at home.   - Monitor nocturnal O2sat    RUL hematoma - worsening  - Monitor chest tube output  - Follow Hgb  - Avoid toradol  - Hold heparin subQ per Thoracic Surgery     CARDIOVASCULAR:  Hemodynamically stable - Not on pressors. Continue hemodynamic monitoring.  Telemetry (medical test) - Reviewed by me today independently. Normal sinus rhythm.    HTN - stable. Hold home antihypertensives for now.     Paroxysmal Afib - stable.  - Hold anticoagulation until chest tube removed.    Mitral regurgitation - stable.  - continue BP and rate control    CAD - not on antiplatelet at home. stable.    Carotid atherosclerosis - not on antiplatelet at home. stable.      RENAL:  Stable - Monitor IOs and electrolytes. Keep K above 4.0 and Mg above 2.0.         GASTROINTESTINAL:  GI prophylaxis not indicated  Zofran and Reglan IV PRN for nausea  Regular consistency diet            HEMATOLOGIC:  No signs of active bleeding. Monitor Hgb in CBC in AM  DVT prophylaxis with heparin subQ         INFECTIOUS DISEASE:    Necrotizing granuloma on biopsy - stable.   - Follow up OR cultures. Continue airborne isolation.  - Check Quant Gold  - AFB sputum         ENDOCRINE:  Stable – Monitor glucose fingersticks for goal 120-180.  Hypothyroidism - stable. Continue Synthroid.         ONCOLOGY:  Lung nodule - Improved. S/P resection. Follow up final pathology.  - Chest tube – Pleurevac water seal. Monitor chest tube output. Repeat CXR today.        Pertinent clinical, laboratory, radiographic, hemodynamic, echocardiographic, respiratory data, microbiologic data and chart were reviewed by myself and analyzed frequently throughout the course of the day and night by myself.    Plan discussed at length with the CTICU staff and Attending CT Surgeon -   Dr Hi Us.      Patient's status was discussed with patient at bedside.    	  I have spent 50 minutes of time with this patient monitoring hemodynamic status, respiratory status, and coordinating care in the ICU.      Spike Reinoso DO FACEP

## 2025-01-17 NOTE — PROGRESS NOTE ADULT - SUBJECTIVE AND OBJECTIVE BOX
POST ANESTHESIA EVALUATION    64y Female POSTOP DAY 1 S/P     MENTAL STATUS: Patient participation [ X ] Awake     [  ] Arousable     [  ] Sedated    AIRWAY PATENCY: [X  ] Satisfactory  [  ] Other:     Vital Signs Last 24 Hrs  T(C): 36.7 (17 Jan 2025 12:00), Max: 37 (16 Jan 2025 16:00)  T(F): 98.1 (17 Jan 2025 12:00), Max: 98.6 (16 Jan 2025 16:00)  HR: 86 (17 Jan 2025 14:10) (55 - 86)  BP: 125/51 (17 Jan 2025 14:10) (109/59 - 154/63)  BP(mean): 70 (17 Jan 2025 14:10) (70 - 91)  RR: 24 (17 Jan 2025 14:10) (12 - 24)  SpO2: 94% (17 Jan 2025 14:10) (92% - 100%)    Parameters below as of 17 Jan 2025 14:10  Patient On (Oxygen Delivery Method): room air      I&O's Summary    16 Jan 2025 07:01  -  17 Jan 2025 07:00  --------------------------------------------------------  IN: 1120 mL / OUT: 1115 mL / NET: 5 mL    17 Jan 2025 07:01  -  17 Jan 2025 14:53  --------------------------------------------------------  IN: 820 mL / OUT: 0 mL / NET: 820 mL          NAUSEA/ VOMITTING:  [ X ] NONE  [  ] CONTROLLED [  ] OTHER     PAIN: [ X ] CONTROLLED WITH CURRENT REGIMEN  [  ] OTHER    [ X ] NO APPARENT ANESTHESIA COMPLICATIONS      Comments:

## 2025-01-18 ENCOUNTER — TRANSCRIPTION ENCOUNTER (OUTPATIENT)
Age: 65
End: 2025-01-18

## 2025-01-18 VITALS
TEMPERATURE: 98 F | OXYGEN SATURATION: 97 % | DIASTOLIC BLOOD PRESSURE: 57 MMHG | HEART RATE: 79 BPM | RESPIRATION RATE: 14 BRPM | SYSTOLIC BLOOD PRESSURE: 128 MMHG

## 2025-01-18 LAB
ALBUMIN SERPL ELPH-MCNC: 4 G/DL — SIGNIFICANT CHANGE UP (ref 3.3–5)
ALP SERPL-CCNC: 69 U/L — SIGNIFICANT CHANGE UP (ref 40–120)
ALT FLD-CCNC: 16 U/L — SIGNIFICANT CHANGE UP (ref 4–33)
ANION GAP SERPL CALC-SCNC: 12 MMOL/L — SIGNIFICANT CHANGE UP (ref 7–14)
AST SERPL-CCNC: 25 U/L — SIGNIFICANT CHANGE UP (ref 4–32)
BILIRUB SERPL-MCNC: 0.6 MG/DL — SIGNIFICANT CHANGE UP (ref 0.2–1.2)
BUN SERPL-MCNC: 13 MG/DL — SIGNIFICANT CHANGE UP (ref 7–23)
CALCIUM SERPL-MCNC: 8.8 MG/DL — SIGNIFICANT CHANGE UP (ref 8.4–10.5)
CHLORIDE SERPL-SCNC: 102 MMOL/L — SIGNIFICANT CHANGE UP (ref 98–107)
CO2 SERPL-SCNC: 24 MMOL/L — SIGNIFICANT CHANGE UP (ref 22–31)
CREAT SERPL-MCNC: 0.55 MG/DL — SIGNIFICANT CHANGE UP (ref 0.5–1.3)
EGFR: 103 ML/MIN/1.73M2 — SIGNIFICANT CHANGE UP
GLUCOSE SERPL-MCNC: 96 MG/DL — SIGNIFICANT CHANGE UP (ref 70–99)
HCT VFR BLD CALC: 38.8 % — SIGNIFICANT CHANGE UP (ref 34.5–45)
HGB BLD-MCNC: 13.6 G/DL — SIGNIFICANT CHANGE UP (ref 11.5–15.5)
MAGNESIUM SERPL-MCNC: 1.9 MG/DL — SIGNIFICANT CHANGE UP (ref 1.6–2.6)
MCHC RBC-ENTMCNC: 33.2 PG — SIGNIFICANT CHANGE UP (ref 27–34)
MCHC RBC-ENTMCNC: 35.1 G/DL — SIGNIFICANT CHANGE UP (ref 32–36)
MCV RBC AUTO: 94.6 FL — SIGNIFICANT CHANGE UP (ref 80–100)
NIGHT BLUE STAIN TISS: SIGNIFICANT CHANGE UP
NRBC # BLD: 0 /100 WBCS — SIGNIFICANT CHANGE UP (ref 0–0)
NRBC # FLD: 0 K/UL — SIGNIFICANT CHANGE UP (ref 0–0)
PHOSPHATE SERPL-MCNC: 2.3 MG/DL — LOW (ref 2.5–4.5)
PLATELET # BLD AUTO: 119 K/UL — LOW (ref 150–400)
POTASSIUM SERPL-MCNC: 4.1 MMOL/L — SIGNIFICANT CHANGE UP (ref 3.5–5.3)
POTASSIUM SERPL-SCNC: 4.1 MMOL/L — SIGNIFICANT CHANGE UP (ref 3.5–5.3)
PROT SERPL-MCNC: 6.7 G/DL — SIGNIFICANT CHANGE UP (ref 6–8.3)
RBC # BLD: 4.1 M/UL — SIGNIFICANT CHANGE UP (ref 3.8–5.2)
RBC # FLD: 13.2 % — SIGNIFICANT CHANGE UP (ref 10.3–14.5)
SODIUM SERPL-SCNC: 138 MMOL/L — SIGNIFICANT CHANGE UP (ref 135–145)
SPECIMEN SOURCE: SIGNIFICANT CHANGE UP
WBC # BLD: 11.93 K/UL — HIGH (ref 3.8–10.5)
WBC # FLD AUTO: 11.93 K/UL — HIGH (ref 3.8–10.5)

## 2025-01-18 PROCEDURE — 99233 SBSQ HOSP IP/OBS HIGH 50: CPT

## 2025-01-18 PROCEDURE — 71045 X-RAY EXAM CHEST 1 VIEW: CPT | Mod: 26

## 2025-01-18 RX ORDER — SOD PHOS DI, MONO/K PHOS MONO 250 MG
1 TABLET ORAL ONCE
Refills: 0 | Status: COMPLETED | OUTPATIENT
Start: 2025-01-18 | End: 2025-01-18

## 2025-01-18 RX ORDER — SENNOSIDES 8.6 MG/1
1 TABLET, FILM COATED ORAL
Qty: 0 | Refills: 0 | DISCHARGE
Start: 2025-01-18

## 2025-01-18 RX ORDER — ACETAMINOPHEN 80 MG/.8ML
2 SOLUTION/ DROPS ORAL
Qty: 30 | Refills: 0
Start: 2025-01-18

## 2025-01-18 RX ORDER — MAGNESIUM SULFATE 500 MG/ML
1 INJECTION, SOLUTION INTRAMUSCULAR; INTRAVENOUS ONCE
Refills: 0 | Status: COMPLETED | OUTPATIENT
Start: 2025-01-18 | End: 2025-01-18

## 2025-01-18 RX ORDER — OXYCODONE HCL 15 MG
1 TABLET ORAL
Qty: 18 | Refills: 0
Start: 2025-01-18 | End: 2025-01-20

## 2025-01-18 RX ADMIN — MAGNESIUM SULFATE 100 GRAM(S): 500 INJECTION, SOLUTION INTRAMUSCULAR; INTRAVENOUS at 06:21

## 2025-01-18 RX ADMIN — SENNOSIDES 1 TABLET(S): 8.6 TABLET, FILM COATED ORAL at 11:04

## 2025-01-18 RX ADMIN — LEVOTHYROXINE SODIUM 137 MICROGRAM(S): 175 TABLET ORAL at 06:21

## 2025-01-18 RX ADMIN — Medication 1 PACKET(S): at 06:21

## 2025-01-18 RX ADMIN — ACETAMINOPHEN 650 MILLIGRAM(S): 80 SOLUTION/ DROPS ORAL at 11:04

## 2025-01-18 RX ADMIN — ALBUTEROL SULFATE 2.5 MILLIGRAM(S): 90 INHALANT RESPIRATORY (INHALATION) at 03:42

## 2025-01-18 RX ADMIN — ACETAMINOPHEN 650 MILLIGRAM(S): 80 SOLUTION/ DROPS ORAL at 11:38

## 2025-01-18 RX ADMIN — SODIUM CHLORIDE 4 MILLILITER(S): 9 INJECTION, SOLUTION INTRAMUSCULAR; INTRAVENOUS; SUBCUTANEOUS at 03:42

## 2025-01-18 RX ADMIN — ACETAMINOPHEN 650 MILLIGRAM(S): 80 SOLUTION/ DROPS ORAL at 06:21

## 2025-01-18 NOTE — DISCHARGE NOTE NURSING/CASE MANAGEMENT/SOCIAL WORK - NSDCPEFALRISK_GEN_ALL_CORE
For information on Fall & Injury Prevention, visit: [email or web address] OR  [email or web address] OR  https://www.cdc.gov/steadi/patient.html

## 2025-01-18 NOTE — DISCHARGE NOTE PROVIDER - NSDCCPCAREPLAN_GEN_ALL_CORE_FT
PRINCIPAL DISCHARGE DIAGNOSIS  Diagnosis: Mass of right lung  Assessment and Plan of Treatment:

## 2025-01-18 NOTE — DISCHARGE NOTE PROVIDER - NSDCFUADDINST_GEN_ALL_CORE_FT
Follow up with Dr. Ballard in 12-14 days. Call Thoracic Surgery office [phone] to schedule an appointment. Please, obtain a CXR 1-2 days prior to your appointment and bring a copy with you.  Please, make an appointment with your Primary care provider.   Blue Stitch will be removed by Dr. Ballard in the office.  You may shower in 24 hours with dressing and remove in the shower. Keep the wound clean and dry it well after showering.  It’s OK if some fluid comes out of the chest tube hole unless blood or purulent.  No Driving while taking pain meds. Some coughing and discomfort is expected.   Follow up with Dr. Us in 12-14 days. Call Thoracic Surgery office [phone] to schedule an appointment. Please, obtain a CXR 1-2 days prior to your appointment and bring a copy with you.  Please, make an appointment with your Primary care provider.   Blue Stitch will be removed by Dr. Us in the office.  You may shower in 24 hours with dressing and remove in the shower. Keep the wound clean and dry it well after showering.  It’s OK if some fluid comes out of the chest tube hole unless blood or purulent.  No Driving while taking pain meds. Some coughing and discomfort is expected.

## 2025-01-18 NOTE — DISCHARGE NOTE NURSING/CASE MANAGEMENT/SOCIAL WORK - FINANCIAL ASSISTANCE
Chief Complaint   Patient presents with   • Medicare Wellness Visit     subsequent      Patient is here for follow up on multiple medical problems.    1. Medicare annual wellness visit, subsequent   2. Nodule of right lung  3. Chronic obstructive pulmonary disease, unspecified COPD type    HISTORY OF PRESENT ILLNESS:  The patient is a 76 year old male who is here today for follow up of multiple medical problems:      1. Medicare annual wellness visit, subsequent: Please see questionnaire below:   Do you have an Advanced Directive? yes    Would you like additional information on Advanced Directives? No    How would you rate your health in general? POOR    What word best describes your health? FRAIL    Are you physically able to do what you want to do every day? always    In the past 2 weeks, have you felt down, depressed or hopeless? (PHQ2) no    Felt little interest or pleasure in doing things? (PHQ2) no    In the past 4 weeks, has your physical or emotional health limited your social activities with others? no    Have you recently been bothered by sexual problems? no    Have you recently been bothered by tiredness or fatigue? no    Have you recently been bothered by bodily pain? no    Have you recently been bothered by stress/feeling overwhelmed? no    Have you recently been bothered by anger/frustration? no    Do you have a problem taking your medications? no    Do you eat less than 2-3 meals a day? YES    Do you follow a special diet? no    Do you need help to prepare your meals? no    Do you need help to feed yourself? no    Do you have any problems with your teeth or dentures? no    Do you need help to bathe yourself? no    Do you need help to dress yourself? no    Do you need help to move in or out of bed or a chair? no    Do you need help to use the toilet? no    Do you have trouble controlling your bowels? no    Do you regularly get physical activity? yes    Have you had any recent falls or are you concerned  about falling? no    Do you need help to get to places outside of walking distance? no    Do you need help to go shopping for groceries or clothes? YES    Do you need help to do your housework or laundry? no    Do you need help to handle your own money? no    Do you feel safe at home? yes    Do you have questions or concerns about your hearing? no    Do you have a problem using the telephone? no    Do you wear your seat belt when in a vehicle? yes        Functional ability and safety evaluation: Patient denies any home safety concerns. No recurrent injuries, trauma or fall. No hearing problems. Whisper test is unremarkable. Discussed with patient seatbelt use and safety.    Detection of cognitive impairment: Patient denies any significant change in memory, no change in mental status.    Over the last 2 weeks, how often have you been bothered by the following problems?         PHQ2 Score:  0  1. Little interest or pleasure in doing things?:  Not at all  2. Feeling down, depressed, or hopeless?:  Not at all     PHQ9 Score:  1  3. Trouble falling, staying asleep or sleeping too much?:  Not at all  4. Feeling tired or having little energy?:  Not at all  5. Poor appetite or overeating?:  Several days  6. Feeling bad about yourself - or that you are a failure or that you have let yourself or your family down?:  Not at all  7. Trouble concentrating on things such as reading a newspaper or watching television?:  Not at all  8. Moving or speaking so slowly that other people could have noticed? Or the opposite - being so fidgety or restless that you were moving around a lot more than usual?:  Not at all  9. Thoughts that you would be better off dead, or of hurting yourself in some way?:  Not at all         Advance Care planning: This was discussed thoroughly today with the patient, all information was given. All questions were answered.    Patient care teams:  Dr. Seamus Costa MD- PCP  Dr. Clement Vincent MD-  Pulmonary  Dr. Karen Moreno MD- Hematology/oncology      2. Nodule of right lung: Patient's most recent CT chest June 5, 2017 shows:     FINDINGS:     No axillary, hilar, or mediastinal lymphadenopathy is appreciated.     There are no pleural or pericardial effusions.     A 4 mm pleural-based right middle lobe nodule that was seen on the previous  study is stable on series 3 image 93. A 3 mm probable intrapulmonary lymph  node that was noted on the previous study is also unchanged, along the left  major fissure on image 48.     Minimal groundglass and reticular nodular tree-in-bud type opacities  persist scattered throughout both lungs.     Moderate emphysematous changes are redemonstrated in both lungs.     There is moderate hypoechoic plaque in the aorta and coronary arteries.     There is a chronic T9 compression fracture.           IMPRESSION:     1.  Persistent scattered mild groundglass and tree-in-bud pulmonary  opacities, likely inflammatory or infectious in nature.  2.  4 mm pleural-based right middle lobe nodule remains unchanged. No  further follow-up is necessary for this nodule (or for the aforementioned 3  mm possible intrapulmonary lymph node) according to the latest Fleischner  Society guidelines.  3.  Stable emphysema.      3. Chronic obstructive pulmonary disease (COPD):  Patient is clinically stable, no acute exacerbation of chronic obstructive pulmonary disease, no cough, no wheezing, no shortness of breath. Patient states that his albuterol inhaler works better for him than the nebulizer he has been following up with pulmonologist in that regard. Patient is tolerating medications well.  Medications were reviewed per Smart Chart.              Lab Services on 10/06/2017   Component Date Value Ref Range Status   • WBC 10/06/2017 4.2  4.2 - 11.0 K/mcL Final   • RBC 10/06/2017 2.22* 4.50 - 5.90 mil/mcL Final   • HGB 10/06/2017 8.7* 13.0 - 17.0 g/dL Final   • HCT 10/06/2017 27.5* 39.0 - 51.0 % Final    • MCV 10/06/2017 123.9* 78.0 - 100.0 fl Final   • MCH 10/06/2017 39.2* 26.0 - 34.0 pg Final   • MCHC 10/06/2017 31.6* 32.0 - 36.5 g/dL Final   • RDW-CV 10/06/2017 14.1  11.0 - 15.0 % Final   • PLT 10/06/2017 216  140 - 450 K/mcL Final   • DIFF TYPE 10/06/2017 AUTOMATED DIFFERENTIAL   Final   • Neutrophil 10/06/2017 54  % Final   • LYMPH 10/06/2017 22  % Final   • MONO 10/06/2017 12  % Final   • EOSIN 10/06/2017 12  % Final   • BASO 10/06/2017 0  % Final   • Absolute Neutrophil 10/06/2017 2.2  1.8 - 7.7 K/mcL Final   • Absolute Lymph 10/06/2017 0.9* 1.0 - 4.0 K/mcL Final   • Absolute Mono 10/06/2017 0.5  0.3 - 0.9 K/mcL Final   • Absolute Eos 10/06/2017 0.5  0.1 - 0.5 K/mcL Final   • Absolute Baso 10/06/2017 0.0  0.0 - 0.3 K/mcL Final   • Fasting Status 10/06/2017 12  hrs Final   • Sodium 10/06/2017 139  135 - 145 mmol/L Final   • Potassium 10/06/2017 4.5  3.4 - 5.1 mmol/L Final   • Chloride 10/06/2017 95* 98 - 107 mmol/L Final   • Carbon Dioxide 10/06/2017 37* 21 - 32 mmol/L Final   • Anion Gap 10/06/2017 12  10 - 20 mmol/L Final   • Glucose 10/06/2017 87  65 - 99 mg/dL Final   • BUN 10/06/2017 14  6 - 20 mg/dL Final   • Creatinine 10/06/2017 0.64* 0.67 - 1.17 mg/dL Final   • GFR Estimate,  10/06/2017 >90   Final   • GFR Estimate, Non  10/06/2017 >90   Final   • BUN/Creatinine Ratio 10/06/2017 22  7 - 25 Final   • CALCIUM 10/06/2017 8.7  8.4 - 10.2 mg/dL Final   • TOTAL BILIRUBIN 10/06/2017 0.3  0.2 - 1.0 mg/dL Final   • AST/SGOT 10/06/2017 12  <38 Units/L Final   • ALT/SGPT 10/06/2017 19  <79 Units/L Final   • ALK PHOSPHATASE 10/06/2017 75  45 - 117 Units/L Final   • TOTAL PROTEIN 10/06/2017 7.2  6.4 - 8.2 g/dL Final   • Albumin 10/06/2017 3.9  3.6 - 5.1 g/dL Final   • GLOBULIN 10/06/2017 3.3  2.0 - 4.0 g/dL Final   • A/G Ratio, Serum 10/06/2017 1.2  1.0 - 2.4 Final   • FASTING STATUS 10/06/2017 12  hrs Final   • CHOLESTEROL 10/06/2017 107  <200 mg/dL Final    Comment:    Desirable             <200  Borderline High      200 to 239  High                 >=240        • CALCULATED LDL 10/06/2017 35  <130 mg/dL Final    Comment: OPTIMAL               <100  NEAR OPTIMAL          100-129  BORDERLINE HIGH       130-159  HIGH                  160-189  VERY HIGH             >=190     • HDL 10/06/2017 53  >39 mg/dL Final    Comment: Low            <40  Borderline Low 40 to 49  Near Optimal   50 to 59  Optimal        >=60     • TRIGLYCERIDE 10/06/2017 95  <150 mg/dL Final    Comment: Normal                   <150  Borderline High          150 to 199  High                     200 to 499  Very High                >=500     • CALCULATED NON HDL 10/06/2017 54  mg/dL Final    Comment:    Therapeutic Target:  CHD and risk equivalents <130  Multiple risk factors    <160  0 to 1 risk factors      <190        • CHOL/HDL 10/06/2017 2.0  <4.5 Final   • TSH 10/06/2017 4.807  0.350 - 5.000 mcUnits/mL Final       Results for orders placed or performed during the hospital encounter of 09/03/17   ECG   Result Value Ref Range    Ventricular Rate EKG/Min (BPM) 83     Atrial Rate (BPM) 86     MN-Interval (MSEC) 124     QRS-Interval (MSEC) 86     QT-Interval (MSEC) 346     QTc 406     P Axis (Degrees) 93     R Axis (Degrees) 88     T Axis (Degrees) 85     REPORT TEXT       .  Normal sinus rhythm  Normal ECG  When compared with ECG of  31-JUL-2016 12:20,  No significant change was found  Confirmed by DR. CONOR LARSON ([id number]) on 9/4/2017 9:11:10 AM         REVIEW OF SYSTEM:  All systems are reviewed and are essentially negative except for as per history of present illness.    PAST MEDICAL HISTORY:  Past Medical History:   Diagnosis Date   • Anemia    • Bronchitis    • CAD (coronary artery disease)    • COPD (chronic obstructive pulmonary disease) (CMS/HCC)    • Esophageal reflux    • Gout    • Hyperlipidemia    • Hypothyroidism    • Lazy eye     left   • PVD (peripheral vascular disease) (CMS/HCC)     right iliac PTCA 2005        PAST SURGICAL HISTORY:  Past Surgical History:   Procedure Laterality Date   • APPENDECTOMY     • BONE MARROW BIOPSY     • COLONOSCOPY DIAGNOSTIC  04/13/2007,04/12/2013    adenoma polyp;repeat 2016   • PTCA      not coronary stents but a left thigh stent       SOCIAL HISTORY:  Social History     Social History   • Marital status:      Spouse name: N/A   • Number of children: 3   • Years of education: N/A     Occupational History   •       Retired  2002     Social History Main Topics   • Smoking status: Former Smoker     Packs/day: 1.00     Years: 50.00     Types: Cigarettes     Quit date: 12/18/2013   • Smokeless tobacco: Never Used   • Alcohol use No      Comment: A couple times a week   • Drug use: No   • Sexual activity: Not Currently      Comment: wife passed     Other Topics Concern   •  Service No   • Blood Transfusions No   • Caffeine Concern No   • Occupational Exposure Yes     asbestos    • Hobby Hazards No   • Sleep Concern No   • Stress Concern No   • Weight Concern No   • Special Diet No   • Exercise No   • Seat Belt Yes     Social History Narrative   • No narrative on file       FAMILY HISTORY:   Family History   Problem Relation Age of Onset   • Cancer Brother 57     small cell lung   • Stroke Mother    • Heart disease Mother    • Stroke Father      Cerebral hemorrhage at age 42   • Diabetes Other      cousin        Past medical history, past surgical history, social history and family history were reviewed per my note for the visit of April 13, 2015.    Current Outpatient Prescriptions   Medication Sig Dispense Refill   • umeclidinium-vilanterol (ANORO ELLIPTA) 62.5-25 MCG/INH inhaler Inhale 1 puff into the lungs daily. Indications: Chronic Obstructive Lung Disease 3 each 1   • albuterol (VENTOLIN HFA) 108 (90 Base) MCG/ACT inhaler Inhale 2 puffs into the lungs every 4 hours as needed for Shortness of Breath or Wheezing. Indications: Chronic Obstructive Lung Disease 3  Inhaler 1   • mirtazapine (REMERON) 7.5 MG tablet Take 1 tablet by mouth nightly. 30 tablet 1   • folic acid (FOLATE) 1 MG tablet Take 1 tablet by mouth daily. 30 tablet 3   • cyanocobalamin 1000 MCG tablet Take 1 tablet by mouth daily. 30 tablet 2   • levothyroxine (SYNTHROID, LEVOTHROID) 75 MCG tablet Take 1 tablet by mouth  daily 90 tablet 1   • lisinopril (ZESTRIL) 5 MG tablet Take 1 tablet by mouth  daily 90 tablet 1   • simvastatin (ZOCOR) 40 MG tablet Take 1 tablet by mouth  nightly 90 tablet 1   • omeprazole (PRILOSEC) 40 MG capsule Take 1 capsule by mouth  daily 90 capsule 1   • albuterol (VENTOLIN) (2.5 MG/3ML) 0.083% nebulizer solution Take 3 mLs by nebulization 4 times daily as needed for Wheezing or Shortness of Breath. 360 mL 11   • indomethacin (INDOCIN) 50 MG capsule Take 1 capsule by mouth 2 times daily as needed for Pain (for Gout). 180 capsule 0   • guaiFENesin-DM (MUCINEX DM)  MG per 12 hr tablet Take 2 tablets by mouth 2 times daily as needed (cough and congestion). 20 tablet 0   • aspirin 81 MG tablet Take 1 tablet by mouth daily. 1 tablet 0     No current facility-administered medications for this visit.        ALLERGIES:  No Known Allergies      PHYSICAL EXAMINATION:   GENERAL:  The patient is awake, alert, oriented x3, in no acute distress.   Blood pressure 126/60, pulse 74, temperature 98.3 °F (36.8 °C), temperature source Oral, resp. rate 18, height 5' 8\" (1.727 m), weight 60.1 kg.  HEENT: Pupils equal, round, reactive to light and accommodation. Extraocular muscles are intact. Sclerae anicteric. Conjunctivae clear. Oropharynx is clear. No ulcers, no exudate, no erythema, no abscess formation. Tympanic membranes are clear and intact bilaterally. No frontal or maxillary sinus tenderness bilaterally. No mastoid tenderness bilaterally. External auditory canals are clear bilaterally. Airway is intact.  Patient is on pulse oxygen at 3 L/m by nasal cannula.  NECK: Supple, no jugular venous  distention. No submandibular, cervical or supraclavicular lymphadenopathy. No carotid bruits. Normal symmetric carotid upstrokes. No thyromegaly. No thyroid nodules. No palpable mass.    HEART: Regular rate and rhythm. S1 and S2 is normal. No S3, S4, no murmur, no rub.    LUNGS: Diffuse rhonchi bilateral no rales. No respiratory distress. Air entry has improved following nebulizer treatment.  ABDOMEN: Soft, normoactive bowel sounds. No tenderness. No rebound tenderness. No palpable mass. No pulsatile mass. No hepatosplenomegaly. No inguinal lymphadenopathy. No CVA tenderness bilaterally.    EXTREMITIES: All 4 extremities with full range of movement. No edema no calf tenderness bilateral, posterior tibial 2+ and equal bilateral.    NEUROLOGICAL: Cranial nerves 2-12 are intact. Motor is 5/5 and equal in bilateral upper and lower extremities. Deep tendon reflexes 2+ and equal in bilateral upper and lower extremities and symmetric. Gait is steady. Sensation is intact. No acute motor or sensory deficits.    SKIN: Warm and dry. No rash. No petechiae.     ASSESSMENT:     (Z00.00) Medicare annual wellness visit, subsequent  (primary encounter diagnosis)  Health promotion was discussed with patient. Recommended annual wellness exam.    (R91.1) Nodule of right lung  Patient is clinically stable and asymptomatic, he has been following up with pulmonologist in that regard, recent CT scan of the chest was reviewed per Smart chart.     (J44.9) Chronic obstructive pulmonary disease, unspecified COPD type (CMS/HCC)  Patient is clinically stable. Continue current management. Reevaluate next visit.    (D12.6) Tubular adenoma of colon  Patient with history of tubular adenoma in 2013 I have recommended that he follow up with gastroenterologist next year for further evaluation and management in that regard patient seems to understand.        On 11/13/2017, Brittni CARRASCO scribed the services personally performed by Seamus Costa  MD.  The documentation recorded by the scribe accurately and completely reflects the service(s) I personally performed and the decisions made by me.         Patient Instructions     We are committed to providing our patients with their test results in a timely manner. If you have access to [facility] in [place], you will receive your results within 5 to 7 days. If your results are normal, a member of our office may call you or you may receive a letter in the mail within 10 to 15 days of your testing. If your results have something additional to discuss, a member of our office will contact you by phone. If at any time you have questions related your results, please feel free to call our office at [phone]    Medicare Wellness Visit  Plan for Preventive Care    A good way for you to stay healthy is to use preventive care.  Medicare covers many services that can help you stay healthy.* The goal of these services is to find any health problems as quickly as possible. Finding problems early can help make them easier to treat.  Your personal plan below lists the services you may need and when they are due.     Health Maintenance Summary     Topic Due On Due Status Completed On    Colorectal Cancer Screening - Colonoscopy Apr 12, 2023 Not Due Apr 12, 2013    Immunization-Zoster  Completed Aug 26, 2013    Immunization - Pneumococcal  Completed Jul 31, 2016    Abdominal Aortic Aneurysm (AAA) Screening   Completed Sep 6, 2016    Medicare Wellness Visit Nov 29, 2017 Due Soon Nov 29, 2016    IMMUNIZATION - DTaP/Tdap/Td Jul 20, 2025 Not Due Jul 20, 2015    Immunization-Influenza  Completed Sep 5, 2017    Lung Cancer Screening Sep 8, 1997 Overdue            Preventive Care for Women and Men    Heart Screenings (Cardiovascular):  · Blood tests are used to check your cholesterol, lipid and triglyceride levels. High levels can increase your risk for heart disease and stroke. High levels can be treated with medications, diet and exercise.  [facility] provides services at a reduced cost to those who are determined to be eligible through [facility]’s financial assistance program. Information regarding [facility]’s financial assistance program can be found by going to [email or web address] or by calling [phone]. Lowering your levels can help keep your heart and blood vessels healthy.  Your provider will order these tests if they are needed.    · An ultrasound is done to see if you have an abdominal aortic aneurysm (AAA).  This is an enlargement of one of the main blood vessels that delivers blood to the body.   In the [place], 9,000 deaths are caused by AAA.  You may not even know you have this problem and as many as 1 in 3 people will have a serious problem if it is not treated.  Early diagnosis allows for more effective treatment and cure.  If you have a family history of AAA or are a male age 65-75 who has smoked, you are at higher risk of an AAA.  Your provider can order this test, if needed.    Colorectal Screening:  · There are many tests that are used to check for cancer of your colon and rectum. You and your provider should discuss what test is best for you and when to have it done.  Options include:  · Screening Colonoscopy: exam of the entire colon, seen through a flexible lighted tube.  · Flexible Sigmoidoscopy: exam of the last third (sigmoid portion) of the colon and rectum, seen through a flexible lighted tube.  · Cologuard DNA stool test: a sample of your stool is used to screen for cancer and unseen blood in your stool.  · Fecal Occult Blood Test: a sample of your stool is studied to find any unseen blood    Flu Shot:  · An immunization that helps to prevent influenza (the flu). You should get this every year. The best time to get the shot is in the fall.    Pneumococcal Shot:  • Vaccines are available that can help prevent pneumococcal disease, which is any type of infection caused by Streptococcus pneumoniae bacteria.   Their use can prevent some cases of pneumonia, meningitis, and sepsis. There are two types of pneumococcal vaccines:   o Conjugate vaccines (PCV-13 or Prevnar 13®) - helps protect against the 13 types of pneumococcal bacteria that are the most common causes of serious infections in  children and adults.    o Polysaccharide vaccine (PPSV23 or Tsolsublc11®) - helps protect against 23 types of pneumococcal bacteria for patients who are recommended to get it.  These vaccines should be given at least 12 months apart.  A booster is usually not needed.     Hepatitis B Shot:  · An immunization that helps to protect people from getting Hepatitis B. Hepatitis B is a virus that spreads through contact with infected blood or body fluids. Many people with the virus do not have symptoms.  The virus can lead to serious problems, such as liver disease. Some people are at higher risk than others. Your doctor will tell you if you need this shot.     Diabetes Screening:  · A test to measure sugar (glucose) in your blood is called a fasting blood sugar. Fasting means you cannot have food or drink for at least 8 hours before the test. This test can detect diabetes long before you may notice symptoms.    Glaucoma Screening:  · Glaucoma screening is performed by your eye doctor. The test measures the fluid pressure inside your eyes to determine if you have glaucoma.     Hepatitis C Screening:  · A blood test to see if you have the hepatitis C virus.  Hepatitis C attacks the liver and is a major cause of chronic liver disease.  Medicare will cover a single screening for all adults born between 1945 & 1965, or high risk patients (people who have injected illegal drugs or people who have had blood transfusions).  High risk patients who continue to inject illegal drugs can be screened for Hepatitis C every year.    Smoking and Tobacco-Use Cessation Counseling:  · Tobacco is the single greatest cause of disease and early death in our country today. Medication and counseling together can increase a person’s chance of quitting for good.   · Medicare covers two quitting attempts per year, with four counseling sessions per attempt (eight sessions in a 12 month period)        Preventive Screening tests for Men    Prostate  Screening:  · PSA - Prostate Cancer blood test.  Experts do not recommend routine screening of healthy men with no signs or symptoms of prostate disease.  However, men should not ignore urinary symptoms, and should discuss their family history with their doctor.    *Medicare pays for many preventive services to keep you healthy. For some of these services, you might have to pay a deductible, coinsurance, and / or copayment.  The amounts vary depending on the type of services you need and the kind of Medicare health plan you have.

## 2025-01-18 NOTE — DISCHARGE NOTE PROVIDER - NSDCMRMEDTOKEN_GEN_ALL_CORE_FT
acetaminophen 500 mg oral tablet: 2 tab(s) orally every 6 hours as needed for  moderate pain  atorvastatin 20 mg oral tablet: 1 tab(s) orally once a day (at bedtime)  Chest X-ray PA/Lateral: please fax results to Dr Us [phone] ICD J93.9  Eliquis 5 mg oral tablet: 1 tab(s) orally 2 times a day  levothyroxine 137 mcg (0.137 mg) oral tablet: 1 tab(s) orally once a day  losartan 100 mg oral tablet: 1 tab(s) orally once a day (at bedtime)  metoprolol succinate 25 mg oral capsule, extended release: 0.5 cap(s) orally 2 times a day  oxyCODONE 5 mg oral tablet: 1 tab(s) orally every 4 hours as needed for  severe pain MDD: 6 tabs  senna leaf extract oral tablet: 1 tab(s) orally once a day  Tiadylt  mg/24 hours oral capsule, extended release: 1 cap(s) orally once a day (at bedtime)  Trelegy Ellipta 100 mcg-62.5 mcg-25 mcg/inh inhalation powder: 1 puff(s) inhaled once a day

## 2025-01-18 NOTE — DISCHARGE NOTE PROVIDER - NSDCFUSCHEDAPPT_GEN_ALL_CORE_FT
Oc Beltran  [facility]  [facility] [address] [facility]  Scheduled Appointment: 03/24/2025

## 2025-01-18 NOTE — DISCHARGE NOTE PROVIDER - HOSPITAL COURSE
64F current smoker (2PPD x50yrs), with  Carotid atherosclerosis, CAD (no intervention), emphysema ( denies recent exacerbation) , HLD, HTN, Hypothyroidism, Paroxysmal Afib on Eliquis, Mild MARIA FERNANDA. On 1/16/25 she is s/p FB, Uniportal R VATS, RUL wedge. AFBs sent for necrotizing granuloma. Chest tube was removed and post pull x-ray reviewed. Patient is stable for discharge home with continued out patient follow up.     Vital Signs Last 24 Hrs  T(C): 36.7 (18 Jan 2025 08:00), Max: 37.1 (17 Jan 2025 16:00)  T(F): 98 (18 Jan 2025 08:00), Max: 98.7 (17 Jan 2025 16:00)  HR: 79 (18 Jan 2025 08:00) (69 - 92)  BP: 128/57 (18 Jan 2025 08:00) (110/53 - 154/63)  BP(mean): 77 (18 Jan 2025 08:00) (69 - 96)  RR: 14 (18 Jan 2025 08:00) (13 - 24)  SpO2: 97% (18 Jan 2025 08:00) (92% - 100%)    Parameters below as of 18 Jan 2025 08:00  Patient On (Oxygen Delivery Method): room air

## 2025-01-18 NOTE — PROGRESS NOTE ADULT - SUBJECTIVE AND OBJECTIVE BOX
CHIEF COMPLAINT: FOLLOW UP IN ICU FOR POSTOPERATIVE CARE OF PATIENT WHO IS S/P LUNG RESECTION      PROCEDURES:  uniportal R VATS, RUL wedge resection 16-Jan-2025       ISSUES:   R pneumothorax  Necrotizing granuloma of lung  Hematoma of R lung  Lung nodule  Post op pain  Chest tube in place  CAD (not on antiplatelet)  Carotid atherosclerosis (not on antiplatelet)  Paroxysmal atrial fibrillation (on Eliquis)  COPD  MARIA FERNANDA  Mitral regurgitation  HTN  HLD  Hypothyroidism  Hx of L breast mass s/p L lumpectomy       INTERVAL EVENTS:   CXR repeated today  R Pneumothorax resolved  R hematoma of lung improved.      HISTORY:   Patient reports moderate pain at chest wall incision sites which is worse with coughing and deep breathing without associated fever or dyspnea. Pain is improved with use of pain meds.     PHYSICAL EXAM:   Gen: Comfortable, No acute distress  Eyes: Sclera white, Conjunctiva normal, Eyelids normal, Pupils symmetrical   ENT: Mucous membranes moist,  ,  ,    Neck: Trachea midline,  ,  ,  ,  ,  ,    CV: Rate regular, Rhythm regular,  ,  ,    Resp: Breath sounds coarse, No accessory muscles use,   Abd: Soft, Non-distended, Non-tender,   ,  ,  ,    Skin: Warm, No peripheral edema of lower extremities,  ,    : No putnam  Neuro: Moving all 4 extremities,    Psych: A&Ox3      ASSESSMENT AND PLAN:     NEURO:  Post-operative Pain - Stable. Pain control with oxycodone and Tylenol PRN.          RESPIRATORY:  Stable on room air - Incentive spirometry. Chest PT and suctioning of secretions. Out of bed to chair and ambulate with assistance. Continuous pulse oximetry for support & to prevent decompensation.       COPD - worsened by recent thoracic surgery.   - Continue albuterol nebs  - Continue hypertonic saline nebs       MARIA FERNANDA - stable. Not on CPAP at home.   - Monitor nocturnal O2sat    RUL hematoma - improving  - Repeat CXR as outpatient      Pneumothorax - improving  - Repeated CXR today which showed improved pneumothorax      CARDIOVASCULAR:  Hemodynamically stable - Not on pressors. Continue hemodynamic monitoring.  Telemetry (medical test) - Reviewed by me today independently. Normal sinus rhythm.    HTN - stable. Resume home antihypertensives.      Paroxysmal Afib - stable.  - Resume Eliquis per Thoracic Surgery    Mitral regurgitation - stable.  - continue BP and rate control    CAD - not on antiplatelet at home. stable.    Carotid atherosclerosis - not on antiplatelet at home. stable.      RENAL:  Stable - Monitor IOs and electrolytes. Keep K above 4.0 and Mg above 2.0.         GASTROINTESTINAL:  GI prophylaxis not indicated  Zofran and Reglan IV PRN for nausea  Regular consistency diet            HEMATOLOGIC:  No signs of active bleeding. Monitor Hgb in CBC in AM  DVT prophylaxis with SCDs         INFECTIOUS DISEASE:    Necrotizing granuloma on biopsy - stable.   - Follow up OR cultures  - F/U Quant Gold  - F/U AFB sputum         ENDOCRINE:  Stable – Monitor glucose fingersticks for goal 120-180.  Hypothyroidism - stable. Continue Synthroid.         ONCOLOGY:  Lung nodule - Improved. S/P resection. Follow up final pathology.          Pertinent clinical, laboratory, radiographic, hemodynamic, echocardiographic, respiratory data, microbiologic data and chart were reviewed by myself and analyzed frequently throughout the course of the day and night by myself.    Plan discussed at length with the CTICU staff and Attending CT Surgeon -   Dr Del Macias      Patient's status was discussed with patient at bedside.       	  I have spent 50 minutes of time with this patient monitoring hemodynamic status, respiratory status, and coordinating care in the ICU.    _________________________  VITAL SIGNS:  Vital Signs Last 24 Hrs  T(C): 36.7 (18 Jan 2025 08:00), Max: 37.1 (17 Jan 2025 16:00)  T(F): 98 (18 Jan 2025 08:00), Max: 98.7 (17 Jan 2025 16:00)  HR: 79 (18 Jan 2025 08:00) (69 - 92)  BP: 128/57 (18 Jan 2025 08:00) (115/52 - 153/78)  BP(mean): 77 (18 Jan 2025 08:00) (71 - 88)  RR: 14 (18 Jan 2025 08:00) (13 - 24)  SpO2: 97% (18 Jan 2025 08:00) (92% - 100%)    Parameters below as of 18 Jan 2025 08:00  Patient On (Oxygen Delivery Method): room air      I/Os:   I&O's Detail    17 Jan 2025 07:01  -  18 Jan 2025 07:00  --------------------------------------------------------  IN:    IV PiggyBack: 100 mL    Lactated Ringers: 180 mL    Oral Fluid: 1040 mL  Total IN: 1320 mL    OUT:    Chest Tube (mL): 0 mL    Voided (mL): 2050 mL  Total OUT: 2050 mL    Total NET: -730 mL              MEDICATIONS:  MEDICATIONS  (STANDING):  acetaminophen     Tablet .. 650 milliGRAM(s) Oral every 6 hours  albuterol    0.083% 2.5 milliGRAM(s) Nebulizer every 6 hours  atorvastatin 20 milliGRAM(s) Oral at bedtime  chlorhexidine 2% Cloths 1 Application(s) Topical daily  dornase fred[place]ick Solution 2.5 milliGRAM(s) Inhalation daily  fluticasone propionate/ salmeterol 500-50 MICROgram(s) Diskus 1 Dose(s) Inhalation two times a day  levothyroxine 137 MICROGram(s) Oral daily  senna 1 Tablet(s) Oral daily  sodium chloride 3%  Inhalation 4 milliLiter(s) Inhalation every 6 hours    MEDICATIONS  (PRN):  HYDROmorphone  Injectable 0.3 milliGRAM(s) IV Push every 3 hours PRN Severe breaktkhrough Pain (7 - 10)  melatonin 6 milliGRAM(s) Oral at bedtime PRN Insomnia  metoclopramide Injectable 10 milliGRAM(s) IV Push every 6 hours PRN Nausea/Vomitting  naloxone Injectable 0.1 milliGRAM(s) IV Push every 3 minutes PRN For ANY of the following changes in patient status:  A. RR LESS THAN 10 breaths per minute, B. Oxygen saturation LESS THAN 90%, C. Sedation score of 6  ondansetron Injectable 4 milliGRAM(s) IV Push every 6 hours PRN Nausea  oxyCODONE    IR 5 milliGRAM(s) Oral every 3 hours PRN Moderate to Severe Pain (4 - 10)      LABS:  Laboratory data was independently reviewed by me today.                           13.6   11.93 )-----------( 119      ( 18 Jan 2025 03:30 )             38.8     01-18    138  |  102  |  13  ----------------------------<  96  4.1   |  24  |  0.55    Ca    8.8      18 Jan 2025 03:30  Phos  2.3     01-18  Mg     1.90     01-18    TPro  6.7  /  Alb  4.0  /  TBili  0.6  /  DBili  x   /  AST  25  /  ALT  16  /  AlkPhos  69  01-18    LIVER FUNCTIONS - ( 18 Jan 2025 03:30 )  Alb: 4.0 g/dL / Pro: 6.7 g/dL / ALK PHOS: 69 U/L / ALT: 16 U/L / AST: 25 U/L / GGT: x           PT/INR - ( 17 Jan 2025 03:00 )   PT: 10.1 sec;   INR: <0.90 ratio         PTT - ( 17 Jan 2025 03:00 )  PTT:22.7 sec    Urinalysis Basic - ( 18 Jan 2025 03:30 )    Color: x / Appearance: x / SG: x / pH: x  Gluc: 96 mg/dL / Ketone: x  / Bili: x / Urobili: x   Blood: x / Protein: x / Nitrite: x   Leuk Esterase: x / RBC: x / WBC x   Sq Epi: x / Non Sq Epi: x / Bacteria: x        RADIOLOGY:   Radiology images were independently reviewed by me today. Reports were reviewed by me today.    Xray Chest 1 View- PORTABLE-Routine:   ACC: [id number] EXAM:  XR CHEST PORTABLE ROUTINE 1V   ORDERED BY: PARKER LOPEZ     PROCEDURE DATE:  01/18/2025          INTERPRETATION:  DATE OF STUDY: 1/18/25    PRIOR: 1/17/25    CLINICAL INDICATION: Assess for pneumothorax    TECHNIQUE: AP radiograph of the chest.    FINDINGS:  The heart is normal in size.  Opacity surrounding the right chest surgical sutures has diminished in   size.  No pneumothorax or pleural effusion.    IMPRESSION:    Decreased opacity surrounding the right sided chain sutures.  No pneumothorax.    --- End of Report ---            ANN SAUCEDO MD; Attending Radiologist  This document has been electronically signed. Jan 18 2025  2:23PM (01-18-25 @ 09:11)  Xray Chest 1 View- PORTABLE-Urgent:   ACC: [id number] EXAM:  XR CHEST PORTABLE URGENT 1V   ORDERED BY: PARKER LOPEZ     PROCEDURE DATE:  01/17/2025          INTERPRETATION:  CLINICAL INFORMATION: Chest tube removal    TIME OF EXAMINATION: January 17 at 2:52 PM    EXAM: Portable chest    FINDINGS:    The right chest tube has been removed.  Tiny apical pneumothorax.  Right midlung opacity surrounding chain sutures possibly hematoma.  Streaky atelectasis at the left lung base.        COMPARISON: January 16        IMPRESSION: Status post chest tube removal with tiny apical pneumothorax.    --- End of Report ---            HIREN OATES MD; Attending Radiologist  This document has been electronically signed. Jan 17 2025  4:35PM (01-17-25 @ 15:18)  Xray Chest 1 View AP/PA:   ACC: [id number] EXAM:  XR CHEST AP OR PA 1V   ORDERED BY: AFUA RONQUILLO     ACC: [id number] EXAM:  XR CHEST PORTABLE URGENT 1V   ORDERED BY: LATA KING     PROCEDURE DATE:  01/16/2025          INTERPRETATION:  EXAMINATION: XR CHEST URGENT, XR CHEST    CLINICAL INDICATION: F/u    TECHNIQUE: Single frontal, portable view of the chest was obtained.    COMPARISON: Chest x-ray 1/16/2025.    FINDINGS:    January 16 at 6:01 PM:  Right-sided chest tube terminating at the lung apex  The heart is normal in size.  Opacity surrounding the chain sutures unchanged.  There is no pneumothorax or pleural effusion.  Spondylitic changes of thoracolumbar spine.    January 17 at 5:28 AM:  No interval change.    IMPRESSION: Status post right thoracotomy with chest tube and no   pneumothorax.      --- End of Report ---          JAISON SMITH MD; Resident Radiologist  This document has been electronically signed.  HIREN OATES MD; Attending Radiologist  This document has been electronically signed. Jan17 2025  8:25AM (01-17-25 @ 05:50)

## 2025-01-18 NOTE — DISCHARGE NOTE PROVIDER - NSDCACTIVITY_GEN_ALL_CORE
No restrictions/Do not drive or operate machinery/Showering allowed/Walking - Indoors allowed/Follow Instructions Provided by your Surgical Team

## 2025-01-18 NOTE — DISCHARGE NOTE PROVIDER - NSDCCPTREATMENT_GEN_ALL_CORE_FT
PRINCIPAL PROCEDURE  Procedure: VATS wedge resection of right upper lobe of lung  Findings and Treatment:

## 2025-01-18 NOTE — DISCHARGE NOTE NURSING/CASE MANAGEMENT/SOCIAL WORK - PATIENT PORTAL LINK FT
You can access the FollowMyHealth Patient Portal offered by [facility] by registering at the following website: [email or web address]. By joining [facility] FollowMyHealth portal, you will also be able to view your health information using other applications (apps) compatible with our system.

## 2025-01-18 NOTE — DISCHARGE NOTE PROVIDER - CARE PROVIDER_API CALL
[facility]  Thoracic Surgery  [address] [place] [address] [place] [address]  Phone: [phone]  Fax: [phone]  Follow Up Time:

## 2025-01-21 LAB
CULTURE RESULTS: SIGNIFICANT CHANGE UP
CULTURE RESULTS: SIGNIFICANT CHANGE UP
SPECIMEN SOURCE: SIGNIFICANT CHANGE UP
SPECIMEN SOURCE: SIGNIFICANT CHANGE UP

## 2025-01-23 LAB
GAMMA INTERFERON BACKGROUND BLD IA-ACNC: 0.03 IU/ML — SIGNIFICANT CHANGE UP
M TB IFN-G BLD-IMP: ABNORMAL
M TB IFN-G CD4+ BCKGRND COR BLD-ACNC: 0 IU/ML — SIGNIFICANT CHANGE UP
M TB IFN-G CD4+CD8+ BCKGRND COR BLD-ACNC: 0 IU/ML — SIGNIFICANT CHANGE UP
QUANT TB PLUS MITOGEN MINUS NIL: 0.28 IU/ML — SIGNIFICANT CHANGE UP

## 2025-01-26 LAB — NIGHT BLUE STAIN TISS: ABNORMAL

## 2025-01-27 LAB — SURGICAL PATHOLOGY STUDY: SIGNIFICANT CHANGE UP

## 2025-01-28 LAB — NIGHT BLUE STAIN TISS: ABNORMAL

## 2025-01-31 ENCOUNTER — APPOINTMENT (OUTPATIENT)
Dept: RADIOLOGY | Facility: CLINIC | Age: 65
End: 2025-01-31
Payer: COMMERCIAL

## 2025-01-31 ENCOUNTER — RESULT REVIEW (OUTPATIENT)
Age: 65
End: 2025-01-31

## 2025-01-31 PROCEDURE — 71046 X-RAY EXAM CHEST 2 VIEWS: CPT

## 2025-02-03 ENCOUNTER — APPOINTMENT (OUTPATIENT)
Dept: THORACIC SURGERY | Facility: CLINIC | Age: 65
End: 2025-02-03
Payer: COMMERCIAL

## 2025-02-03 VITALS
HEIGHT: 60.5 IN | BODY MASS INDEX: 27.92 KG/M2 | HEART RATE: 75 BPM | RESPIRATION RATE: 16 BRPM | SYSTOLIC BLOOD PRESSURE: 134 MMHG | DIASTOLIC BLOOD PRESSURE: 65 MMHG | OXYGEN SATURATION: 99 % | WEIGHT: 146 LBS

## 2025-02-03 DIAGNOSIS — R91.8 OTHER NONSPECIFIC ABNORMAL FINDING OF LUNG FIELD: ICD-10-CM

## 2025-02-03 PROCEDURE — 99024 POSTOP FOLLOW-UP VISIT: CPT

## 2025-03-24 ENCOUNTER — APPOINTMENT (OUTPATIENT)
Dept: OTOLARYNGOLOGY | Facility: CLINIC | Age: 65
End: 2025-03-24
Payer: COMMERCIAL

## 2025-03-24 VITALS — BODY MASS INDEX: 27.92 KG/M2 | WEIGHT: 146 LBS | HEIGHT: 60.5 IN

## 2025-03-24 DIAGNOSIS — J38.3 OTHER DISEASES OF VOCAL CORDS: ICD-10-CM

## 2025-03-24 DIAGNOSIS — J32.9 CHRONIC SINUSITIS, UNSPECIFIED: ICD-10-CM

## 2025-03-24 PROCEDURE — 99213 OFFICE O/P EST LOW 20 MIN: CPT | Mod: 25

## 2025-03-24 PROCEDURE — 31579 LARYNGOSCOPY TELESCOPIC: CPT

## 2025-04-15 ENCOUNTER — NON-APPOINTMENT (OUTPATIENT)
Age: 65
End: 2025-04-15

## 2025-04-15 PROBLEM — Z03.818 ENCOUNTER FOR PATIENT CONCERN ABOUT EXPOSURE TO INFECTIOUS ORGANISM: Status: ACTIVE | Noted: 2025-04-15

## 2025-04-15 PROBLEM — Z71.1 CONCERN ABOUT INFECTIOUS DISEASE WITHOUT DIAGNOSIS: Status: ACTIVE | Noted: 2025-04-15

## 2025-04-15 PROBLEM — A31.0 MAI (MYCOBACTERIUM AVIUM-INTRACELLULARE): Status: ACTIVE | Noted: 2025-04-15

## 2025-04-16 ENCOUNTER — APPOINTMENT (OUTPATIENT)
Dept: INFECTIOUS DISEASE | Facility: CLINIC | Age: 65
End: 2025-04-16
Payer: COMMERCIAL

## 2025-04-16 VITALS
SYSTOLIC BLOOD PRESSURE: 130 MMHG | HEART RATE: 66 BPM | WEIGHT: 149 LBS | HEIGHT: 60.5 IN | DIASTOLIC BLOOD PRESSURE: 75 MMHG | OXYGEN SATURATION: 97 % | BODY MASS INDEX: 28.5 KG/M2

## 2025-04-16 DIAGNOSIS — A31.0 PULMONARY MYCOBACTERIAL INFECTION: ICD-10-CM

## 2025-04-16 DIAGNOSIS — J43.2 CENTRILOBULAR EMPHYSEMA: ICD-10-CM

## 2025-04-16 DIAGNOSIS — Z03.818 ENCOUNTER FOR OBSERVATION FOR SUSPECTED EXPOSURE TO OTHER BIOLOGICAL AGENTS RULED OUT: ICD-10-CM

## 2025-04-16 DIAGNOSIS — Z71.1 PERSON WITH FEARED HEALTH COMPLAINT IN WHOM NO DIAGNOSIS IS MADE: ICD-10-CM

## 2025-04-16 PROCEDURE — 99205 OFFICE O/P NEW HI 60 MIN: CPT

## 2025-04-24 LAB
ACID FAST STN SPT: NORMAL
ACID FAST STN SPT: NORMAL

## 2025-04-27 LAB — ACID FAST STN SPT: NORMAL

## 2025-05-01 ENCOUNTER — TRANSCRIPTION ENCOUNTER (OUTPATIENT)
Age: 65
End: 2025-05-01

## 2025-05-02 ENCOUNTER — TRANSCRIPTION ENCOUNTER (OUTPATIENT)
Age: 65
End: 2025-05-02

## 2025-05-02 ENCOUNTER — NON-APPOINTMENT (OUTPATIENT)
Age: 65
End: 2025-05-02

## 2025-05-03 ENCOUNTER — APPOINTMENT (OUTPATIENT)
Dept: CT IMAGING | Facility: CLINIC | Age: 65
End: 2025-05-03

## 2025-05-03 PROCEDURE — 71250 CT THORAX DX C-: CPT

## 2025-05-07 ENCOUNTER — APPOINTMENT (OUTPATIENT)
Dept: THORACIC SURGERY | Facility: CLINIC | Age: 65
End: 2025-05-07
Payer: COMMERCIAL

## 2025-05-07 ENCOUNTER — NON-APPOINTMENT (OUTPATIENT)
Age: 65
End: 2025-05-07

## 2025-05-07 VITALS
SYSTOLIC BLOOD PRESSURE: 151 MMHG | OXYGEN SATURATION: 97 % | DIASTOLIC BLOOD PRESSURE: 80 MMHG | BODY MASS INDEX: 29.06 KG/M2 | HEIGHT: 60 IN | WEIGHT: 148 LBS | HEART RATE: 75 BPM

## 2025-05-07 DIAGNOSIS — A31.0 PULMONARY MYCOBACTERIAL INFECTION: ICD-10-CM

## 2025-05-07 PROCEDURE — 99213 OFFICE O/P EST LOW 20 MIN: CPT

## 2025-05-12 ENCOUNTER — APPOINTMENT (OUTPATIENT)
Dept: THORACIC SURGERY | Facility: CLINIC | Age: 65
End: 2025-05-12

## 2025-05-12 ENCOUNTER — NON-APPOINTMENT (OUTPATIENT)
Age: 65
End: 2025-05-12

## 2025-05-13 ENCOUNTER — TRANSCRIPTION ENCOUNTER (OUTPATIENT)
Age: 65
End: 2025-05-13

## 2025-05-14 ENCOUNTER — APPOINTMENT (OUTPATIENT)
Dept: INFECTIOUS DISEASE | Facility: CLINIC | Age: 65
End: 2025-05-14

## 2025-06-10 ENCOUNTER — NON-APPOINTMENT (OUTPATIENT)
Age: 65
End: 2025-06-10

## 2025-06-11 ENCOUNTER — LABORATORY RESULT (OUTPATIENT)
Age: 65
End: 2025-06-11

## 2025-06-11 ENCOUNTER — APPOINTMENT (OUTPATIENT)
Dept: INFECTIOUS DISEASE | Facility: CLINIC | Age: 65
End: 2025-06-11
Payer: COMMERCIAL

## 2025-06-11 VITALS
OXYGEN SATURATION: 96 % | BODY MASS INDEX: 28.69 KG/M2 | WEIGHT: 150 LBS | HEART RATE: 64 BPM | SYSTOLIC BLOOD PRESSURE: 137 MMHG | HEIGHT: 60.5 IN | DIASTOLIC BLOOD PRESSURE: 77 MMHG

## 2025-06-11 PROCEDURE — 99214 OFFICE O/P EST MOD 30 MIN: CPT

## 2025-06-11 RX ORDER — METFORMIN HYDROCHLORIDE 500 MG/1
500 TABLET, COATED ORAL
Refills: 0 | Status: ACTIVE | COMMUNITY

## 2025-06-13 LAB
A FUMIGATUS IGE QN: <0.1 KUA/L
DEPRECATED A FUMIGATUS IGE RAST QL: 0

## 2025-06-14 ENCOUNTER — NON-APPOINTMENT (OUTPATIENT)
Age: 65
End: 2025-06-14

## 2025-06-14 LAB
ALTERNARIA TENUIS/ALTERNATA IGG: 3.3 MCG/ML
ASPER FUMCAPG(M3): 25.4 MCG/ML
AUREOBASCAPG(M12): <2 MCG/ML
LACEYELLA SACCHARI IGG: 4.8 MCG/ML
MICROPOLYCAPG(M22): <2 MCG/ML
PENIC CHRYCAPG(M1): 16.6 MCG/ML
PHOMA BETAE IGG: 4.7 MCG/ML
TRICHODERMA VIRIDE IGG: <2 MCG/ML

## 2025-06-17 LAB
A FLAVUS AB FLD QL: NEGATIVE
A FUMIGATUS AB FLD QL: NEGATIVE
A NIGER AB FLD QL: NEGATIVE

## 2025-06-18 LAB
ACID FAST STN SPT: NORMAL
ACID FAST STN SPT: NORMAL

## 2025-06-21 LAB — ACID FAST STN SPT: NORMAL

## 2025-06-23 ENCOUNTER — TRANSCRIPTION ENCOUNTER (OUTPATIENT)
Age: 65
End: 2025-06-23

## 2025-06-26 ENCOUNTER — APPOINTMENT (OUTPATIENT)
Dept: CT IMAGING | Facility: CLINIC | Age: 65
End: 2025-06-26

## 2025-06-26 PROCEDURE — 71250 CT THORAX DX C-: CPT

## 2025-07-07 ENCOUNTER — TRANSCRIPTION ENCOUNTER (OUTPATIENT)
Age: 65
End: 2025-07-07

## 2025-07-16 ENCOUNTER — APPOINTMENT (OUTPATIENT)
Dept: THORACIC SURGERY | Facility: CLINIC | Age: 65
End: 2025-07-16
Payer: COMMERCIAL

## 2025-07-16 PROCEDURE — 99214 OFFICE O/P EST MOD 30 MIN: CPT

## 2025-07-17 ENCOUNTER — TRANSCRIPTION ENCOUNTER (OUTPATIENT)
Age: 65
End: 2025-07-17

## 2025-07-24 ENCOUNTER — APPOINTMENT (OUTPATIENT)
Dept: MRI IMAGING | Facility: CLINIC | Age: 65
End: 2025-07-24
Payer: COMMERCIAL

## 2025-07-24 PROCEDURE — 74183 MRI ABD W/O CNTR FLWD CNTR: CPT

## 2025-07-24 PROCEDURE — A9585: CPT

## 2025-07-25 ENCOUNTER — NON-APPOINTMENT (OUTPATIENT)
Age: 65
End: 2025-07-25

## 2025-07-29 ENCOUNTER — NON-APPOINTMENT (OUTPATIENT)
Age: 65
End: 2025-07-29

## 2025-07-30 ENCOUNTER — APPOINTMENT (OUTPATIENT)
Dept: INFECTIOUS DISEASE | Facility: CLINIC | Age: 65
End: 2025-07-30
Payer: COMMERCIAL

## 2025-07-30 VITALS
HEIGHT: 60.5 IN | OXYGEN SATURATION: 96 % | WEIGHT: 145 LBS | SYSTOLIC BLOOD PRESSURE: 117 MMHG | BODY MASS INDEX: 27.73 KG/M2 | DIASTOLIC BLOOD PRESSURE: 69 MMHG | HEART RATE: 67 BPM

## 2025-07-30 DIAGNOSIS — A31.0 PULMONARY MYCOBACTERIAL INFECTION: ICD-10-CM

## 2025-07-30 DIAGNOSIS — F17.200 NICOTINE DEPENDENCE, UNSPECIFIED, UNCOMPLICATED: ICD-10-CM

## 2025-07-30 DIAGNOSIS — J43.2 CENTRILOBULAR EMPHYSEMA: ICD-10-CM

## 2025-07-30 DIAGNOSIS — K76.0 FATTY (CHANGE OF) LIVER, NOT ELSEWHERE CLASSIFIED: ICD-10-CM

## 2025-07-30 DIAGNOSIS — R91.8 OTHER NONSPECIFIC ABNORMAL FINDING OF LUNG FIELD: ICD-10-CM

## 2025-07-30 PROCEDURE — 99215 OFFICE O/P EST HI 40 MIN: CPT

## 2025-07-30 RX ORDER — FOLIC ACID 20 MG
CAPSULE ORAL
Refills: 0 | Status: ACTIVE | COMMUNITY

## 2025-07-30 RX ORDER — HYDROCHLOROTHIAZIDE 12.5 MG/1
12.5 CAPSULE ORAL
Refills: 0 | Status: ACTIVE | COMMUNITY

## 2025-07-30 RX ORDER — CARVEDILOL 25 MG/1
25 TABLET, FILM COATED ORAL
Refills: 0 | Status: ACTIVE | COMMUNITY

## 2025-07-30 RX ORDER — ESOMEPRAZOLE MAGNESIUM 40 MG/1
40 CAPSULE, DELAYED RELEASE ORAL
Refills: 0 | Status: ACTIVE | COMMUNITY

## 2025-07-30 RX ORDER — ATORVASTATIN CALCIUM 80 MG/1
80 TABLET, FILM COATED ORAL
Refills: 0 | Status: ACTIVE | COMMUNITY

## 2025-08-04 ENCOUNTER — TRANSCRIPTION ENCOUNTER (OUTPATIENT)
Age: 65
End: 2025-08-04

## 2025-08-04 LAB
AST SERPL W P-5'-P-CCNC: 19 IU/L
CHOLEST SERPL-MCNC: 142 MG/DL
COMMENT:: NORMAL
FIBROSIS STAGE SERPL QL: NORMAL
FIBROSURE ALPHA 2 MACROGLOBULINS: 197 MG/DL
FIBROSURE ALT (SGPT): 19 IU/L
FIBROSURE APOLIPOPROTEIN A1: 114 MG/DL
FIBROSURE GGT: 10 IU/L
FIBROSURE HAPTOGLOBIN: 239 MG/DL
FIBROSURE SCORING: NORMAL
FIBROSURE TOTAL BILIRUBIN: 0.3 MG/DL
GLUCOSE SERPL-MCNC: 113 MG/DL
INTERPRETATIONS:: NORMAL
LIVER FIBR SCORE SERPL CALC.FIBROSURE: 0.12
Lab: NORMAL
NASH SCORING: NORMAL
NECROINFLAMMATORY ACT GRADE SERPL QL: NORMAL
NECROINFLAMMATORY ACT SCORE SERPL: 0.38
SERVICE CMNT-IMP: NORMAL
STEATOSIS GRADE: NORMAL
STEATOSIS SCORE: 0.53
STEATOSIS SCORING: NORMAL
TRIGL SERPL-MCNC: 138 MG/DL

## 2025-08-13 ENCOUNTER — APPOINTMENT (OUTPATIENT)
Dept: THORACIC SURGERY | Facility: CLINIC | Age: 65
End: 2025-08-13
Payer: COMMERCIAL

## 2025-08-13 DIAGNOSIS — K76.9 LIVER DISEASE, UNSPECIFIED: ICD-10-CM

## 2025-08-13 DIAGNOSIS — A31.0 PULMONARY MYCOBACTERIAL INFECTION: ICD-10-CM

## 2025-08-13 PROCEDURE — 99213 OFFICE O/P EST LOW 20 MIN: CPT | Mod: 93

## (undated) DEVICE — FOLEY TRAY 16FR 5CC LTX UMETER CLOSED

## (undated) DEVICE — GOWN XL

## (undated) DEVICE — DRAPE INSTRUMENT POUCH 6.75" X 11"

## (undated) DEVICE — DRAPE 3/4 SHEET 52X76"

## (undated) DEVICE — STAPLER ETHICON ECHELON 3000 STANDARD 60MM X 340MM

## (undated) DEVICE — BIOPSY FORCEP J&J MONARCH SMOOTH CUP

## (undated) DEVICE — D HELP - CLEARVIEW CLEARIFY SYSTEM

## (undated) DEVICE — SPECIMEN CONTAINER 100ML

## (undated) DEVICE — BIOPSY FORCEP OLYMPUS ALLIGATOR 2.0MM

## (undated) DEVICE — DRAPE MAYO STAND 30"

## (undated) DEVICE — VENODYNE/SCD SLEEVE CALF MEDIUM

## (undated) DEVICE — SOL IRR POUR NS 0.9% 500ML

## (undated) DEVICE — NDL ASPIRATION VIZISHOT2 22G

## (undated) DEVICE — ELECTRO LUBE ANTI-STICK SOLUTION FOR CAUTERY TIP

## (undated) DEVICE — ELCTR BOVIE TIP BLADE INSULATED 2.75" EDGE

## (undated) DEVICE — VALVE SUCTION EVIS 160/200/240

## (undated) DEVICE — WARMING BLANKET LOWER ADULT

## (undated) DEVICE — TUBING SUCTION NONCONDUCTIVE 6MM X 12FT

## (undated) DEVICE — ELCTR BOVIE PENCIL SMOKE EVACUATION

## (undated) DEVICE — PACK BRONCHOSCOPY

## (undated) DEVICE — ENDOCATCH 10MM

## (undated) DEVICE — DRSG CURITY GAUZE SPONGE 4 X 4" 12-PLY

## (undated) DEVICE — APPLICATOR FOR PROGEL EXTENDED SPRAY TIP 29CM

## (undated) DEVICE — DRSG STERISTRIPS 0.5 X 4"

## (undated) DEVICE — SUT POLYSORB 4-0 P-12 UNDYED

## (undated) DEVICE — NDL ARCHPOINT PULMONARY 21G

## (undated) DEVICE — TRAP SPECIMEN SPUTUM 40CC

## (undated) DEVICE — SYR SLIP 10CC

## (undated) DEVICE — BRONCHOSCOPE GALAXY CONN IRR ASPIR TUBE SCP GUIDE

## (undated) DEVICE — POSITIONER FOAM HEAD CRADLE (PINK)

## (undated) DEVICE — ADAPTER FIBEROPTIC BRONCHOSCOPE DUAL AXIS SWIVEL

## (undated) DEVICE — BLADE SURGICAL #10 STAINLESS

## (undated) DEVICE — POSITIONER STRAP ARMBOARD VELCRO TS-30

## (undated) DEVICE — VALVE BIOPSY BRONCHOVIDEOSCOPE

## (undated) DEVICE — DRSG ALLEVYN BORDER LITE 2X2"

## (undated) DEVICE — SUT SURGIPRO 0 30" GS-22

## (undated) DEVICE — GLV 8 PROTEXIS (WHITE)

## (undated) DEVICE — DRSG TELFA 3 X 8

## (undated) DEVICE — SUT POLYSORB 2-0 30" V-20 UNDYED

## (undated) DEVICE — MARKING PEN W RULER

## (undated) DEVICE — DRAPE TOWEL BLUE 17" X 24"

## (undated) DEVICE — TUBING SUCTION 20FT

## (undated) DEVICE — MEDICATION LABELS W MARKER

## (undated) DEVICE — ENDOCATCH GENERAL 10MM (PURPLE)

## (undated) DEVICE — WARMING BLANKET UPPER ADULT

## (undated) DEVICE — ENDOCATCH II 15MM